# Patient Record
Sex: FEMALE | Race: BLACK OR AFRICAN AMERICAN | Employment: OTHER | URBAN - METROPOLITAN AREA
[De-identification: names, ages, dates, MRNs, and addresses within clinical notes are randomized per-mention and may not be internally consistent; named-entity substitution may affect disease eponyms.]

---

## 2020-11-12 ENCOUNTER — VIRTUAL VISIT (OUTPATIENT)
Dept: SURGERY | Age: 65
End: 2020-11-12
Payer: COMMERCIAL

## 2020-11-12 VITALS — BODY MASS INDEX: 34.55 KG/M2 | WEIGHT: 215 LBS | HEIGHT: 66 IN

## 2020-11-12 DIAGNOSIS — Z98.84 S/P BARIATRIC SURGERY: ICD-10-CM

## 2020-11-12 DIAGNOSIS — J45.909 UNCOMPLICATED ASTHMA, UNSPECIFIED ASTHMA SEVERITY, UNSPECIFIED WHETHER PERSISTENT: ICD-10-CM

## 2020-11-12 DIAGNOSIS — I10 ESSENTIAL HYPERTENSION: ICD-10-CM

## 2020-11-12 DIAGNOSIS — M17.9 OSTEOARTHRITIS OF KNEE, UNSPECIFIED LATERALITY, UNSPECIFIED OSTEOARTHRITIS TYPE: ICD-10-CM

## 2020-11-12 DIAGNOSIS — E66.01 SEVERE OBESITY (BMI 35.0-35.9 WITH COMORBIDITY) (HCC): Primary | ICD-10-CM

## 2020-11-12 DIAGNOSIS — K90.9 INTESTINAL MALABSORPTION, UNSPECIFIED TYPE: ICD-10-CM

## 2020-11-12 PROCEDURE — 99205 OFFICE O/P NEW HI 60 MIN: CPT | Performed by: NURSE PRACTITIONER

## 2020-11-12 RX ORDER — CONJUGATED ESTROGENS 0.62 MG/G
CREAM VAGINAL
COMMUNITY
Start: 2020-09-24

## 2020-11-12 RX ORDER — CLOBETASOL PROPIONATE 0.5 MG/G
OINTMENT TOPICAL
COMMUNITY
Start: 2020-11-06

## 2020-11-12 RX ORDER — CETIRIZINE HCL 10 MG
TABLET ORAL
COMMUNITY
Start: 2020-10-22

## 2020-11-12 RX ORDER — AA/PROT/LYSINE/METHIO/VIT C/B6 50-12.5 MG
TABLET ORAL
COMMUNITY
End: 2022-04-19 | Stop reason: ALTCHOICE

## 2020-11-12 RX ORDER — LANOLIN ALCOHOL/MO/W.PET/CERES
CREAM (GRAM) TOPICAL
COMMUNITY
Start: 2020-10-13

## 2020-11-12 RX ORDER — CEPHALEXIN 500 MG/1
CAPSULE ORAL
COMMUNITY
Start: 2020-10-29 | End: 2021-11-18 | Stop reason: ALTCHOICE

## 2020-11-12 RX ORDER — GABAPENTIN 300 MG/1
CAPSULE ORAL
COMMUNITY
Start: 2020-10-16

## 2020-11-12 NOTE — PROGRESS NOTES
Revision Surgery Consultation    Subjective: The patient is a 60 y.o. obese female with a Body mass index is 35.23 kg/m². .  The patient had a laparoscopic gastric bypass procedure done approximatly 13 years ago in [facility] by Dr. Dago Gordon. her starting weight prior to surgery was 260.  she ultimately lost approximately 109 lbs with a subsequent weight regain of 106 lbs. her peak EBWL at 2 years out from surgery was 87% and now her current EBWL is 36%. her last bariatric follow-up was 4 years ago with Dr. Dago Gordon. Ramos Chapmanbaum notes that she had no issues in the immediate post-op phase and had no hospital readmissions in the remote post-op phase. she currently is having the following issues related to her health:increasing HgA1c back up to 6% and had to restart BP medications. she is here today to discuss a possible revision of her gastric bypass because of weight regain. All of their prior evaluations available by both their PCP's and specialists physicians have been reviewed today either in the Care Everywhere portal or scanned under the media tab. I have spent a large portion of my initial consultation today reviewing the patients current dietary habits which have contributed to their health issues, weight regain and  their current obesity. They understand that generally speaking,  weight regain is  a function of resuming less that ideal dietary habits instead of being a procedural issue. They understand that older procedures are more likely to be associated with a less that perfect procedural result, such as a prior vertical banded gastroplasty or non divided gastric bypass. These procedures are more likely to result in staple line failures with resultant weight regain. This has been explained to the patient via diagrams of these older procedures and given to the patient.     I have suggested to them personally a dietary regimen that they can initiate now to help with their status as it pertains to their weight. They understand that the most important aspect of their journey through their weight loss endeavor will be their adherence to a new lifestyle of healthy eating behavior. They also understand that an adherence to an exercise program will not only help with weight loss but is ultimately important in weight maintenance. Patient Active Problem List    Diagnosis Date Noted    Obesity, Class II, BMI 35-39.9, with comorbidity     Obese     S/P bariatric surgery 08/12/2015    Intestinal malabsorption 08/12/2015    HTN (hypertension) 02/18/2011    Asthma 02/18/2011    DJD (degenerative joint disease) 02/18/2011    Hypercholesteremia 02/18/2011      Past Surgical History:   Procedure Laterality Date    HX BLADDER SUSPENSION      HX GYN      removal remaining ovary after hysterectomy    HX HYSTERECTOMY      HX ORTHOPAEDIC      achilles tendon repair    LAP GASTRIC BYPASS/LANA-EN-Y  1/2/07    Liver bx, extensive lysis of adhesions      Social History     Tobacco Use    Smoking status: Never Smoker    Smokeless tobacco: Never Used   Substance Use Topics    Alcohol use: No      Family History   Problem Relation Age of Onset    Hypertension Mother     Arthritis-rheumatoid Mother     Diabetes Father     Lung Disease Father     Hypertension Brother       Current Outpatient Medications   Medication Sig Dispense Refill    gabapentin (NEURONTIN) 300 mg capsule       cephALEXin (KEFLEX) 500 mg capsule       magnesium oxide (MAG-OX) 400 mg tablet       coenzyme q10 (Co Q-10) 10 mg cap Take  by mouth.  Premarin 0.625 mg/gram vaginal cream       clobetasoL (TEMOVATE) 0.05 % ointment       cetirizine (ZYRTEC) 10 mg tablet       amLODIPine (NORVASC) 10 mg tablet Take  by mouth daily.  cyanocobalamin (VITAMIN B-12) 1,000 mcg/mL injection 1 mL by SubCUTAneous route every fourteen (14) days.  2 Vial 11    Insulin Syringe-Needle U-100 (ADVOCATE SYRINGES) 1 mL 30 gauge x 5/16 syrg 1 mL by Does Not Apply route every thirty (30) days. 12 Syringe 2    multivitamin (ONE A DAY) tablet Take 1 Tab by mouth daily.  lisinopril (PRINIVIL, ZESTRIL) 20 mg tablet Take  by mouth daily.  cholecalciferol (VITAMIN D3) 1,000 unit tablet Take  by mouth daily.  montelukast (SINGULAIR) 10 mg tablet Take 10 mg by mouth daily.  fexofenadine (ALLEGRA) 180 mg tablet Take  by mouth.  omega-3 fatty acids-vitamin e (FISH OIL) 1,000 mg cap Take 1 Cap by mouth.  therapeutic multivitamin (THERAGRAN) tablet Take 1 Tab by mouth daily.  b complex vitamins (B COMPLEX 1) tablet Take 1 Tab by mouth daily.  CALCIUM CITRATE/VITAMIN D3 (CALCIUM CITRATE + D PO) Take  by mouth.  Biotin 2,500 mcg cap Take  by mouth.        Allergies   Allergen Reactions    Morphine Other (comments)     headache    Sulfa (Sulfonamide Antibiotics) Hives          Review of Systems:            General - No history or complaints of unexpected fever, chills, or weight loss  Head/Neck - No history or complaints of headache, diplopia, dysphagia, hearing loss  Cardiac - No history or complaints of chest pain, palpitations, murmur, or shortness of breath  Pulmonary - No history or complaints of shortness of breath, productive cough, hemoptysis  Gastrointestinal - No history or complaints of reflux,  abdominal pain, obstipation/constipation, blood per rectum  Genitourinary - No history or complaints of hematuria/dysuria, stress urinary incontinence symptoms, or renal lithiasis  Musculoskeletal - No history or complaints of joint pain or muscular weakness  Hematologic - No history or complaints of bleeding disorders, blood transfusions, sickle cell anemia  Neurologic - No history or complaints of  migraine headaches, seizure activity, syncopal episodes, TIA or stroke  Integumentary - No history or complaints of rashes, abnormal nevi, skin cancer  Gynecological - No history of heavy menses/abnormal menses           Objective:     Visit Vitals   5' 5.5\" (1.664 m)   Wt 97.5 kg (215 lb)   BMI 35.23 kg/m²       Physical Examination:   General appearance - well appearing and in no distress  Mental status - alert, oriented to person, place, and time  Pulmonary - normal respiratory effort  Abdomen - no obvious distention  Neurological - normal speech, no focal findings or movement disorder noted  Extremities - normal movement  Musculoskeletal - moving extremities without difficulty  Skin - no rashes, no suspicious skin lesions noted      Labs / Old Records: Old operative reports reviewed if available and are scanned under the media tab or reviewed under Care Everywhere        Assessment:     Morbid obesity status post gastric bypass procedure approximately 13 years ago with complaint of weight regain. Plan: 1. Weight regain-Today in our office I had a lengthy discussion with Julieta Littlejohn regarding the nature of their prior procedure. We discussed the anatomical changes to their anatomy and how this relates to  contributing weight regain. Our office will continue to attempt to obtain any medical records, if not obtained or available already ,related to their procedure. It was also discussed today that before any decisions can be made regarding a possible revision of their initial  procedure that an upper GI swallow study must be obtained to evaluate their post surgical anatomy. They understand that in patients with a prior open gastric bypass, those patients are not revision candidates due to adhesive disease usually, and the fact that post surgical anatomy  usually can not be improved upon. They understand if their gastric bypass was performed laparoscopically, then this situation might be more amendable to gastric band placement over their prior gastric bypass.  They understand, as I have explained today, that the adhesive disease associated with prior  procedures is at times a rate limiting factor. This precludes our ability to perform a revision procedure safely. The factors that contribute to this are increased risk such as age, health issues and increased risk from a procedural standpoint and have been discussed today. We will proceed with the UGI swallow study as described above. The patient understands all of the above and wishes to proceed with the study. 2.Nutrition-  I have discussed in detail the pitfalls in diet that have contributed to their weight regain and the importance of adhering to a lifelong regimen of dietary goals and proper eating habits. I have discussed the proper lifelong bariatric diet  in detail spending in excess of 20 minutes discussing this. We will schedule them for a dietary consultation with our nutritionist and urge them to continue on a regular follow-up schedule with her. 3.Maintenance vitamins- Today we have discussed the importance of vitamins as it pertains to their procedure and we will obtain appropriate lab to check all levels. They have been provided a handout regarding this today. Total time spent with the patient reviewing their complex history of bariatric surgery,diet, and plan is in excess of 60 minutes. Secondary Diagnoses:     Hypertension - The patient has a clear understanding of how weight loss improves hypertension as a whole, but also they understand that there is a significant genetic component to this disease process. We will monitor the patients blood pressure while in the hospital and the plan would be to continue those medications postoperatively.  If a diuretic is being used we will stop them on discharge to prevent dehydration particularly with the sleeve gastrectomy and the gastric bypass procedures.  They will be instructed to monitor their blood pressure postoperatively while at home and notify their primary care physician in the event of any significantly high or uncharacteristic readings.     Weight Related Arthritis -The patient understands the benefits that weight loss surgery can have on their arthritis but also understands that weight loss is not a guaranteed cure and relief of symptoms is often dependent on the severity of the underlying disease. The patient also understands that traditional pharmaceutical treatments for this diagnosis are usually unavailable to post-operative weight loss patients due to the effects on the gastrointestinal tract. Any changes to the patients medication treatment will ultimately be made the patients PCP with input by our office. Restrictive Airway Disease - We will continue all of their pulmonary medications in the form of oral pills and inhalers in both the perioperative and postoperative period. They understand that their symptoms should improve with weight loss. Any further testing related to this will be turned over to their family physician or pulmonologist. The patient understands that if they require oral or IV steroids in the future that they will notify us. This is particularly important for gastric bypass patients at all times and both sleeve  gastrectomy and gastric bypass patients in the 1 month pre op and 1 month post operative period. They understand that inhaled steroids are exempt from this. This visit with Ms Tylor Perez was performed under virtual telemedicine guidelines during the coronavirus (UINGK-83) public health emergency on 11/12/2020 in an interactive fashion using [employer]. me. They understand that this telemedicine encounter is a billable service, with coverage determined by their insurance carrier. They are aware that they may receive a bill and have provided verbal consent for this virtual visit. This visit was performed with the patient in their home environment and provider was present at [facility]. I have spent over 30 minutes on this visit  both prior to the visit reviewing the patients chart and with the patient face to face.  I have reviewed their medical history, performed a telemedicine physical examination, and discussed the plan of action to date. They understand that they will be asked   to come to the office when our office is allowed normal patient interaction, as dictated by public health officials, for a face-to-face visit to rediscuss all of the things we have talked about today.       Signed By: Josee Gómez NP     November 12, 2020

## 2020-11-12 NOTE — PATIENT INSTRUCTIONS
Patient Instructions 1. Remember hydration goals - minimum of 64 ounces of liquids per day (dehydration is the number one reason for hospital readmission). 2. Continue to monitor carbohydrate and protein intake you need a minimum of  Grams of protein daily- remember to keep your total carbohydrates to 50 grams or less per day for best results. 3. Continue to work towards exercise goals - 60-90 minutes, 5 times a week minimum of deliberate, aerobic exercise is the ultimate goal with strength training 2 times each week. Refer to [employer] for  information. 4. Remember to take vitamins as directed. 5. Attend support group the 2nd Thursday of each month. 6.  Constipation: Milk of Magnesia is for immediate relief only. Miralax is to be used every day if constipation is a chronic problem. 7.  Diarrhea: patients will occasionally develop lactose intolerance after surgery. Check to see if your protein shake has whey in it. If it does try a protein powder or drink that does not have whey and stop all yogurts, cheeses and milks to see if the diarrhea goes away. 8.  If you have had labs drawn. We will only call you if you have abnormal results. Otherwise you can access the lab results in \"mychart\". You will only need the access code the first time you sign on. 9.  Call us at [phone] or email us through [email or web address]" with questions,     concerns or worsening of condition, we have someone on call 24 hours a day. If you are unable to reach our office, you are to go to your Primary Care Physician or the Emergency Department. NOTE TO GASTRIC BYPASS PATIENTS:  (SAME APPLIES TO GASTRIC SLEEVE PATIENTS FOR FIRST TWO MONTHS) Remember that for the rest of your life, you are not able to take the following: 
- NSAIDs (ibuprofen, goody powder, BC powder, Motrin, Advil, Mobic, Voltaren, Excedrin, etc.) - Steroid pills or injections - Smoke (cigarettes or recreational drugs) - Alcohol Use of any of the above may cause ulcers in your stomach which may perforate causing a medical emergency and surgery. Speak to our medical staff if another medical provider requires you to take steroids or NSAIDs. Supplement Resource Guide Importance of Protein:  
Maintains lean body mass, produces antibodies to fight off infections, heals wounds, minimizes hair loss, helps to give you energy, helps with satiety, and keeping you full between meals. Importance of Calcium: 
Needed for healthy bones and teeth, normal blood clotting, and nervous system functioning, higher risk of osteoporosis and bone disease with non-compliance. Importance of Multivitamins: Many functions. Supply you with extra nutrients that you may be missing from food. May lead to iron deficiency anemia, weakness, fatigue, and many other symptoms with non-compliance. Importance of B Vitamins: 
Important for red blood cell formation, metabolism, energy, and helps to maintain a healthy nervous system. Protein Supplement Find one you like now. Use immediately after surgery. Look for: 
35-50g protein each day from your protein supplement once you reach the progression diet. 0-3 g fat per serving 0-3 g sugar per serving Protein drinks should be split in separate dosages. Recommend: Lifelong 1 year + Calcium Supplement:  
 
Start taking within a month after surgery. Look for: Calcium Citrate Plus D (1500 mg per day) Recommend: Citracal 
 
 . Avoid chocolate chewable calcium. Can use chewable bariatric or [employer] brand or similar chewable. The body cannot absorb more than 500-600 mg of calcium at a time. Take for Life Multi-vitamin Supplement:   
 
Start immediately after surgery: any complete chewable, such as: Parkins Complete chewables. Avoid [place] sours or gummies. They lack iron and other important nutrients and also have added sugar. Continue with chewable vitamin or change to adult complete multivitamin one month after surgery. Menstruating women can take a prenatal vitamin. Make sure has at least 18 mg iron and 036-288 mcg folic acid Vitamin B12, B Complex Vitamin, and Biotin Start taking within a month after surgery. Vitamin B12:  1000 mcg of Vitamin B12 three times weekly Must take sublingually (meaning you take it under your tongue) or in a liquid drop form for easy absorption. B Complex Vitamin: Take a pill or liquid drop form once daily. Biotin: This vitamin can help prevent hair loss. Recommend 5mg  
(5000 mcg) a day Biotin is Optional  
 
 
 
 
  
Learning About Physical Activity What is physical activity? Physical activity is any kind of activity that gets your body moving. The types of physical activity that can help you get fit and stay healthy include: · Aerobic or \"cardio\" activities that make your heart beat faster and make you breathe harder, such as brisk walking, riding a bike, or running. Aerobic activities strengthen your heart and lungs and build up your endurance. · Strength training activities that make your muscles work against, or \"resist,\" something, such as lifting weights or doing push-ups. These activities help tone and strengthen your muscles. · Stretches that allow you to move your joints and muscles through their full range of motion. Stretching helps you be more flexible and avoid injury. What are the benefits of physical activity? Being active is one of the best things you can do for your health. It helps you to: · Feel stronger and have more energy to do all the things you like to do. · Focus better at school or work. · Feel, think, and sleep better. · Reach and stay at a healthy weight. · Lose fat and build lean muscle. · Lower your risk for serious health problems. · Keep your bones, muscles, and joints strong. How can you make physical activity part of your life? Get at least 30 minutes of exercise on most days of the week. Walking is a good choice. You also may want to do other activities, such as running, swimming, cycling, or playing tennis or team sports. Pick activities that you likeones that make your heart beat faster, your muscles stronger, and your muscles and joints more flexible. If you find more than one thing you like doing, do them all. You don't have to do the same thing every day. Get your heart pumping every day. Any activity that makes your heart beat faster and keeps it at that rate for a while counts. Here are some great ways to get your heart beating faster: · Go for a brisk walk, run, or bike ride. · Go for a hike or swim. · Go in-line skating. · Play a game of touch football, basketball, or soccer. · Ride a bike. · Play tennis or racquetball. · Climb stairs. Even some household chores can be aerobicjust do them at a faster pace. Vacuuming, raking or mowing the lawn, sweeping the garage, and washing and waxing the car all can help get your heart rate up. Strengthen your muscles during the week. You don't have to lift heavy weights or grow big, bulky muscles to get stronger. Doing a few simple activities that make your muscles work against, or \"resist,\" something can help you get stronger. For example, you can: · Do push-ups or sit-ups, which use your own body weight as resistance. · Lift weights or dumbbells or use stretch bands at home or in a gym or community center. Stretch your muscles often. Stretching will help you as you become more active. It can help you stay flexible, loosen tight muscles, and avoid injury. It can also help improve your balance and posture and can be a great way to relax. Be sure to stretch the muscles you'll be using when you work out.  It's best to warm your muscles slightly before you stretch them. Walk or do some other light aerobic activity for a few minutes, and then start stretching. When you stretch your muscles: · Do it slowly. Stretching is not about going fast or making sudden movements. · Don't push or bounce during a stretch. · Hold each stretch for at least 15 to 30 seconds, if you can. You should feel a stretch in the muscle, but not pain. · Breathe out as you do the stretch. Then breathe in as you hold the stretch. Don't hold your breath. If you're worried about how more activity might affect your health, have a checkup before you start. Follow any special advice your doctor gives you for getting a smart start. Where can you learn more? Go to http://www.gray.com/ Enter M032 in the search box to learn more about \"Learning About Physical Activity. \" Current as of: January 16, 2020               Content Version: 12.6 © 2006-2020 [employer]. Care instructions adapted under license by [employer] (which disclaims liability or warranty for this information). If you have questions about a medical condition or this instruction, always ask your healthcare professional. [address] any warranty or liability for your use of this information.

## 2020-12-14 ENCOUNTER — OFFICE VISIT (OUTPATIENT)
Dept: SURGERY | Age: 65
End: 2020-12-14

## 2020-12-14 VITALS — HEIGHT: 66 IN | WEIGHT: 215 LBS | BODY MASS INDEX: 34.55 KG/M2

## 2020-12-14 DIAGNOSIS — Z98.84 S/P BARIATRIC SURGERY: Primary | ICD-10-CM

## 2020-12-14 NOTE — PROGRESS NOTES
Geovanni Paez participated in an educational session on the importance of starting to make healthy choices prior to weight loss surgery. General healthy foods were reviewed. Diet history was reviewed. Patient set a dietary, exercise, and behavioral goal in order to start making healthy changes now. Visit Vitals  Ht 5' 5.5\" (1.664 m)   Wt 97.5 kg (215 lb)   BMI 35.23 kg/m²           Today the majority of our visit was spent reviewing patient's food recall and identifying areas for improvement. Educated  on the importance of eating 3 meals/day at regularly scheduled times including breakfast within 1st 1-2 hours of waking. Suggested patient use a protein supplement as a meal replacement instead of skipping OR as a between meal high protein snack. Also educated on the importance of including a protein with every meal and snack and reviewed lean sources. Lastly introduced  the Plate Method for Meal Planning and reviewed portions recommended with tips for portion control. Discussed label reading and recommendations for carbohydrate counting. Reviewed the importance of adequate hydration and protein intake - emphasizing the role of protein supplements in the post operative diet. Discussed vitamin and mineral supplementation forever following surgery. Encouraged patient to review key diet principles of surgery and we will discuss individual questions or concerns. Patient was  receptive to education and we will continue to review and reinforce in our follow-up next month. Goals:   1. Work to increase to 3-4 small meals per day, with planned snacks as needed. Recommend following plate method for meal planning - focusing on lean protein, non-starchy vegetables, and measured amounts of starch. - Goal of  g protein and  g carbohydrate per day. - Recommend continuing protein supplement as meal replacement at least 1x/day  2. Increase non caloric fluid to 64 oz per day.   Eliminate caffeine, added sugar, carbonation, and straws.               -Continue to work to decrease sugar sweetened beverages - goal of calorie free beverages only              -Must eliminate caffeine prior to surgery and avoid for ~6-8 weeks  3. Start activity regimen, work to increase ADL              -Try to start walking for at least 30-60  minutes 3-5 days per week  4.  Start Complete MVI, no gummies    12/14/20    Kaia William

## 2021-01-06 ENCOUNTER — APPOINTMENT (OUTPATIENT)
Dept: GENERAL RADIOLOGY | Age: 66
End: 2021-01-06
Attending: SPECIALIST
Payer: COMMERCIAL

## 2021-01-06 ENCOUNTER — HOSPITAL ENCOUNTER (OUTPATIENT)
Age: 66
Setting detail: OUTPATIENT SURGERY
Discharge: HOME | End: 2021-01-06
Attending: SPECIALIST | Admitting: SPECIALIST
Payer: COMMERCIAL

## 2021-01-06 VITALS
DIASTOLIC BLOOD PRESSURE: 77 MMHG | WEIGHT: 222.7 LBS | BODY MASS INDEX: 35.79 KG/M2 | HEART RATE: 77 BPM | TEMPERATURE: 97 F | OXYGEN SATURATION: 100 % | SYSTOLIC BLOOD PRESSURE: 170 MMHG | HEIGHT: 66 IN | RESPIRATION RATE: 18 BRPM

## 2021-01-06 DIAGNOSIS — E66.01 MORBID OBESITY (HCC): ICD-10-CM

## 2021-01-06 DIAGNOSIS — K44.9 HIATAL HERNIA: ICD-10-CM

## 2021-01-06 PROCEDURE — 76040000019: Performed by: SPECIALIST

## 2021-01-06 PROCEDURE — 77030040361 HC SLV COMPR DVT MDII -B: Performed by: SPECIALIST

## 2021-01-06 PROCEDURE — 74240 X-RAY XM UPR GI TRC 1CNTRST: CPT

## 2021-01-06 PROCEDURE — 74240 X-RAY XM UPR GI TRC 1CNTRST: CPT | Performed by: SPECIALIST

## 2021-01-06 PROCEDURE — 74011000255 HC RX REV CODE- 255: Performed by: SPECIALIST

## 2021-01-06 PROCEDURE — 2709999900 HC NON-CHARGEABLE SUPPLY: Performed by: SPECIALIST

## 2021-01-06 NOTE — PROCEDURES
13 years post our gastric bypass seeking revision with weight regain and BMI of 35. UGI shows pouch inside of a notable hiatal hernia which given all factors negates her from having any kind of \"revision or salvage\" type procedure. Needs diet and exercise.   See dictation

## 2021-01-07 NOTE — OP NOTES
[facility]  OPERATIVE REPORT    Name:  Kojo Goyal  MR#:   [id number]  :  1955  ACCOUNT #:  [de-identified]  DATE OF SERVICE:  2021    PREOPERATIVE DIAGNOSIS:  The patient is 13 years post gastric bypass procedure with weight regain. POSTOPERATIVE DIAGNOSIS:  The patient is 13 years post gastric bypass procedure with weight regain. PROCEDURE PERFORMED:  Upper GI study with barium. SURGEON:  Mary Carmen Bauman MD    ASSISTANT:  None. ANESTHESIA:  None. COMPLICATIONS:  None. SPECIMENS REMOVED:  None. IMPLANTS:  None. ESTIMATED BLOOD LOSS:  None. INDICATIONS:  The patient is a patient of mine who is 13 years out from gastric bypass procedure performed laparoscopically. She has regained some weight over time about 25 pounds in total, and her BMI is currently 35. She has inquired as to whether or not she is a candidate for any type of revision. She is here today for upper GI study. PROCEDURE:  The patient was brought to fluoro unit where she was given thin barium. On swallowing the barium, she was noted to have normal peristalsis of her esophagus. She had filling in the distal esophagus with tapering into what appeared to be the gastric pouch, which was vertically oriented. On multiple views of this region, it was apparent that the gastric pouch was pulled into her intrathoracic and the gastrojejunal anastomosis was at the level of the diaphragm. Contrast readily flowed through the anastomosis in the Rylie limb, which was nonobstructed. At this juncture, I have explained to the patient that her pouch is too small for any type of revision. She is also not a candidate for any type of band over bypass procedure since her pouch is basically intrathoracic and that it would be impossible to make that type of procedure work. She is going to enroll in our medical weight loss program in hopes of losing the appropriate amount of weight.       Jean Jacob, MD [id number]  D:  01/06/2021 18:45  T:  01/06/2021 22:44  JOB #:  [id number]

## 2021-01-18 ENCOUNTER — CLINICAL SUPPORT (OUTPATIENT)
Dept: SURGERY | Age: 66
End: 2021-01-18

## 2021-01-18 VITALS — BODY MASS INDEX: 35.68 KG/M2 | HEIGHT: 66 IN | WEIGHT: 222 LBS

## 2021-01-18 DIAGNOSIS — E66.01 SEVERE OBESITY (BMI 35.0-35.9 WITH COMORBIDITY) (HCC): Primary | ICD-10-CM

## 2021-01-18 NOTE — PROGRESS NOTES
Medical Weight Loss Multi-Disciplinary Program    Name: Abdoul Samson   : 1955    Session# 2- revision   Date: 2021    Visit Vitals  Ht 5' 5.5\" (1.664 m)   Wt 100.7 kg (222 lb)   BMI 36.38 kg/m²     Dietary Instructions    Reviewed intake  Understanding low carbohydrates, low sugar, higher protein meals  Instruction given for personal dietary changes  Discussed perceived compliance  Comments: Diet hx reviewed and personal dietary changes discussed. Reviewed recommendation to follow [id number] calorie diet, working to reduce total carbohydrate intake to 50-65 g or less per day and increasing protein intake to  g per day, compared current intake to recommendations. After reviewing regular intake portions  RD reviewed recommend diet progression following surgery. Reviewed addition of simple sugars, excessive fat with protein portions, and displacement of nutrient rich foods with high calorie low nutrient convenience options. We reviewed the bariatric plate method for meal planning with portion guide. Assisted with goal setting, provided recommend snack list, easy to prepare high protein, low carbohydrate food choices, provided handout reviewing complex carbohydrate vs simple sugars and reviewed appropriate portion sizes. Encouraged compliance with protein supplements, vitamins and mineral supplementations, fluid, and  continuing physical activity. Nutritional Hx: What is the number of meals you eat per day? 2-3  Comment:     Do you eat between meals / snack? yes  Typical snack: sweets, candy, snack cakes     Have you sampled protein supplements? no    How often do you eat fast food, take-out restaurant food? 4 times a week    How fast do you eat your meals? rapid    How many sodas/sugared beverages do you drink per day? None avoids     How many caffeinated drinks do you have per day?  Diet soda, coffee    How much water do you drink per day? 4 (8oz glasses)    How often do you consume alcohol? occasionally;     Are you taking complete multivitamin: yes    Physical Activity/Exercise    Discussed Perceived Compliance  Motivation    Patient has not started physical activity regimen, reinforced the importance of regular physical activity for total health and weight management.  Suggested starting walking for at least 5 days per week for 30-45  minutes, patient was  receptive to recommendation.       Behavior Modification    Identify obstacles to trigger change  Achieving/Rewarding goals met  Positive attitude  Comments: Reinforced importance continuing to modify lifestyle patterns and behaviors to promote weight loss and long term weight maintenance       Goals:   1. Work to increase to 3-4 small meals per day, with planned snacks as needed.  Recommend following plate method for meal planning - focusing on lean protein, non-starchy vegetables, and measured amounts of starch.   - Goal of  g protein and  g carbohydrate per day.               - Recommend continuing protein supplement as meal replacement at least 1x/day OR as high protein snack option  2. Increase non caloric fluid to 64 oz per day.  Eliminate caffeine, added sugar, carbonation, and straws.               -Continue to work to decrease sugar sweetened beverages - goal of calorie free beverages only              -Must eliminate caffeine prior to surgery and avoid for ~6-8 weeks   -Practice 30:30 rule,  food and flood   3. Start activity regimen, work to increase ADL  4. Continue vitamin and mineral regimen   2 Complete Multivitamin per day  - Children's Complete Chewable OR Women’s One-A-Days  OR Prenatal MVI  · Taken for LIFE   B12 ? 1000mcg for LIFE  ·  Two options for taking B12:  · sublingually (dissolves under your tongue) or monthly shot (prescription written by your PCP)  · Taken for LIFE    B Complex (B50 supercomlex)  · Taken for at least the first year after surgery   Vitamins D3  · 3000 international units  per day    Calcium Citrate ? 1500 mg/d  broken down into 500 mg 3 times a day   · 3 Calcium Citrate Chewy Bites 3 times a day (sold in our office & online), OR  · Citrical 500-600 mg)3 times a day   · MUST BE TAKEN AT LEAST 2 HOURS AFTER YOUR MVI  · Taken for LIFE   Biotin ? 5000 mcg/day or labeled 5mg/day ? OPTIONAL          Candidate for surgery (per RD):  PENDING-not revision candidate at this time    Dietitian: Asuncion Chavez RD

## 2021-01-28 ENCOUNTER — VIRTUAL VISIT (OUTPATIENT)
Dept: SURGERY | Age: 66
End: 2021-01-28
Payer: COMMERCIAL

## 2021-01-28 VITALS — HEIGHT: 66 IN | WEIGHT: 222 LBS | BODY MASS INDEX: 35.68 KG/M2

## 2021-01-28 DIAGNOSIS — E66.01 SEVERE OBESITY (BMI 35.0-35.9 WITH COMORBIDITY) (HCC): Primary | ICD-10-CM

## 2021-01-28 DIAGNOSIS — I10 ESSENTIAL HYPERTENSION: ICD-10-CM

## 2021-01-28 DIAGNOSIS — E88.81 METABOLIC SYNDROME: ICD-10-CM

## 2021-01-28 DIAGNOSIS — Z98.84 S/P BARIATRIC SURGERY: ICD-10-CM

## 2021-01-28 DIAGNOSIS — K90.9 INTESTINAL MALABSORPTION, UNSPECIFIED TYPE: ICD-10-CM

## 2021-01-28 PROCEDURE — 99214 OFFICE O/P EST MOD 30 MIN: CPT | Performed by: NURSE PRACTITIONER

## 2021-01-28 RX ORDER — MAGNESIUM 200 MG
1000 TABLET ORAL DAILY
COMMUNITY
End: 2022-04-19 | Stop reason: ALTCHOICE

## 2021-01-28 RX ORDER — BLOOD SUGAR DIAGNOSTIC
STRIP MISCELLANEOUS
Qty: 100 PEN NEEDLE | Refills: 0 | Status: SHIPPED | OUTPATIENT
Start: 2021-01-28 | End: 2021-01-28 | Stop reason: SDUPTHER

## 2021-01-28 RX ORDER — BLOOD SUGAR DIAGNOSTIC
STRIP MISCELLANEOUS
Qty: 100 PEN NEEDLE | Refills: 0 | Status: SHIPPED | OUTPATIENT
Start: 2021-01-28 | End: 2021-04-19 | Stop reason: SDUPTHER

## 2021-01-28 RX ORDER — NYSTATIN 100000 U/G
CREAM TOPICAL
COMMUNITY
Start: 2020-12-29

## 2021-01-28 NOTE — PATIENT INSTRUCTIONS
Warnings and Precautions ? Risk of Thyroid C-cell Tumors: If serum calcitonin is measured and found to be elevated, the patient should be further evaluated. Patients with thyroid nodules noted on physical examination or neck imaging should also be further evaluated ? Acute Pancreatitis: Acute pancreatitis, including fatal and non-fatal hemorrhagic or necrotizing pancreatitis, has been observed in patients treated with liraglutide postmarketing. Observe patients carefully for signs and symptoms of pancreatitis (persistent severe abdominal pain, sometimes radiating to the back with or without vomiting). If pancreatitis is suspected, discontinue Saxenda® promptly and if pancreatitis is confirmed, do not restart ? Acute Gallbladder Disease: Substantial or rapid weight loss can increase the risk of cholelithiasis; however, the incidence of acute gallbladder disease was greater in patients treated with Saxenda® than with placebo even after accounting for the degree of weight loss. If cholelithiasis is suspected, gallbladder studies and appropriate clinical follow-up are indicated ? Risk of Hypoglycemia with Concomitant Use of Anti-Diabetic Therapy: When Юлия Arrant is used with an insulin secretagogue (eg, a sulfonylurea) serious hypoglycemia can occur. Consider lowering the dose of the insulin secretagogue to reduce the risk of hypoglycemia. Monitor blood glucose parameters prior to starting Юлия Arrant and during treatment and adjust anti-diabetic drugs as needed  
? Heart Rate Increase: Mean increases in resting heart rate of 2 to 3 beats per minute (bpm) were observed in patients treated with Saxenda®. Monitor heart rate at regular intervals and inform patients to report palpitations or feelings of a racing heartbeat while at rest during treatment with Юлия Arrant. Discontinue Saxenda® in patients who experience a sustained increase in resting heart rate ? Renal Impairment: Acute renal failure and worsening of chronic renal failure, which may sometimes require hemodialysis, have been reported, usually in association with nausea, vomiting, diarrhea, or dehydration. Use caution when initiating or escalating doses of Saxenda® in patients with renal impairment ? Hypersensitivity Reactions: Serious hypersensitivity reactions (eg, anaphylaxis and angioedema) have been reported in patients treated with liraglutide. If a hypersensitivity reaction occurs, patients should stop taking Saxenda® and promptly seek medical advice ? Suicidal Behavior and Ideation: In clinical trials, 9 (0.3%) of 3,384 patients treated with Saxenda® and 2 (0.1%) of the 1,941 treated with placebo reported suicidal ideation; one of the patients treated with Saxenda® attempted suicide. Monitor patients on Saxenda® for the emergence or worsening of depression, suicidal thoughts or behavior, and/or any unusual changes in mood or behavior. Discontinue treatment if patients experience suicidal thoughts or behaviors. Avoid Saxenda® in patients with a history of suicidal attempts or active suicidal ideation Adverse Events ? The most common adverse reactions, reported in ?5% are: nausea, hypoglycemia, diarrhea, constipation, vomiting, headache, decreased appetite, dyspepsia, fatigue, dizziness, abdominal pain, and increased lipase Drug Interactions ? Saxenda® causes a delay of gastric emptying, and has the potential to impact the absorption of concomitantly administered oral medications. Monitor for potential consequences of delayed absorption of oral medications concomitantly administered with Saxenda®

## 2021-01-28 NOTE — PROGRESS NOTES
Medical Weight Loss Consultation    Subjective: The patient is a 60 y.o. obese female with a Body mass index is 36.38 kg/m². .  The patient is s/p laparoscopic gastric bypass procedure done approximately 13 years ago in [address] [place] [address] by Dr. Mynor Reynoso. her starting weight prior to surgery was 260.  she ultimately lost approximately 109 lbs with a subsequent weight regain of 106 lbs. her peak EBWL at 2 years out from surgery was 87% and now her current EBWL is 36%. She was exploring the possibility of surgical revision with Dr Mynor Reynoso, but found on recent UGI NOT to be a surgical candidate and sent for medical weight loss consult. Bariatric comorbidities present are   Patient Active Problem List   Diagnosis Code    HTN (hypertension) I10    Asthma J45.909    S/P bariatric surgery Z98.84    Intestinal malabsorption K90.9    Obese E66.9    Severe obesity (BMI 35.0-35.9 with comorbidity) (HCC) E66.01, Z68.35    DJD (degenerative joint disease) M19.90    Hypercholesteremia E78.00       Weight History  Current weight 222 lbs and BMI is Body mass index is 36.38 kg/m². Our goal weight is 170lb. (this represents a BMI of 28). she realizes that a multiyear dedication to diet and exercise are required to achieve this goal.    Weight loss History  How many weight loss attempts have you had?  several  Which program were you most successful doing?   [facility]    Significant Medical History  Ever diagnosed with sleep apnea or put on CPAP no  Ever had bariatric surgery: yes  Pregnant or planning on becoming pregnant w/in 6 months: no    Significant Psychosocial History   Any history of drug abuse or dependence: no  Current Major Lifestyle Changes: no  Any potential unsupportive people: no  History of binge eating disorder or anorexia : no   If yes, are you currently being treated no    Social History  Social History     Tobacco Use    Smoking status: Never Smoker    Smokeless tobacco: Never Used   Substance Use Topics    Alcohol use: No         Exercise  Have you ever been told by a physician not to exercise?  no          Patient Active Problem List    Diagnosis Date Noted    Severe obesity (BMI 35.0-35.9 with comorbidity) ([facility] [address]     Obese     S/P bariatric surgery 08/12/2015    Intestinal malabsorption 08/12/2015    HTN (hypertension) 02/18/2011    Asthma 02/18/2011    DJD (degenerative joint disease) 02/18/2011    Hypercholesteremia 02/18/2011      Past Surgical History:   Procedure Laterality Date    HX BLADDER SUSPENSION      HX GYN      removal remaining ovary after hysterectomy    HX HYSTERECTOMY      HX ORTHOPAEDIC      achilles tendon repair    LAP GASTRIC BYPASS/LANA-EN-Y  1/2/07    Liver bx, extensive lysis of adhesions      Social History     Tobacco Use    Smoking status: Never Smoker    Smokeless tobacco: Never Used   Substance Use Topics    Alcohol use: No      Family History   Problem Relation Age of Onset    Hypertension Mother     Arthritis-rheumatoid Mother     Diabetes Father     Lung Disease Father     Hypertension Brother       Current Outpatient Medications   Medication Sig Dispense Refill    cyanocobalamin (VITAMIN B-12) 1,000 mcg sublingual tablet Take 1,000 mcg by mouth daily.  nystatin (MYCOSTATIN) topical cream       gabapentin (NEURONTIN) 300 mg capsule       cephALEXin (KEFLEX) 500 mg capsule       magnesium oxide (MAG-OX) 400 mg tablet       coenzyme q10 (Co Q-10) 10 mg cap Take  by mouth.  Premarin 0.625 mg/gram vaginal cream       clobetasoL (TEMOVATE) 0.05 % ointment       cetirizine (ZYRTEC) 10 mg tablet       amLODIPine (NORVASC) 10 mg tablet Take  by mouth daily.  multivitamin (ONE A DAY) tablet Take 1 Tab by mouth daily.  lisinopril (PRINIVIL, ZESTRIL) 20 mg tablet Take  by mouth daily.  cholecalciferol (VITAMIN D3) 1,000 unit tablet Take  by mouth daily.  montelukast (SINGULAIR) 10 mg tablet Take 10 mg by mouth daily.       fexofenadine (ALLEGRA) 180 mg tablet Take  by mouth.  omega-3 fatty acids-vitamin e (FISH OIL) 1,000 mg cap Take 1 Cap by mouth.  therapeutic multivitamin (THERAGRAN) tablet Take 1 Tab by mouth daily.  b complex vitamins (B COMPLEX 1) tablet Take 1 Tab by mouth daily.  CALCIUM CITRATE/VITAMIN D3 (CALCIUM CITRATE + D PO) Take  by mouth.          Allergies   Allergen Reactions    Morphine Other (comments)     headache    Sulfa (Sulfonamide Antibiotics) Hives          Review of Systems:        General - No history or complaints of unexpected fever, chills, or weight loss  Head/Neck - No history or complaints of headache, diplopia, dysphagia, hearing loss  Cardiac - No history or complaints of chest pain, palpitations, murmur, or shortness of breath  Pulmonary - No history or complaints of shortness of breath, productive cough, hemoptysis  Gastrointestinal - no reflux, no  abdominal pain, obstipation/constipation or blood per rectum  Genitourinary - No history or complaints of hematuria/dysuria, stress urinary incontinence symptoms, or renal lithiasis  Musculoskeletal - has joint pain,  no muscular weakness  Hematologic - No history or complaints of bleeding disorders,  No blood transfusions  Neurologic - No history or complaints of  migraine headaches, seizure activity, syncopal episodes, TIA or stroke  Integumentary - No history or complaints of rashes, abnormal nevi, skin cancer  Gynecological - No abnormal bleeding s/p hysterectomy    Objective:     Visit Vitals   5' 5.5\" (1.664 m)   Wt 100.7 kg (222 lb)   BMI 36.38 kg/m²       Physical Examination:   General appearance - well appearing and in no distress  Mental status - alert, oriented to person, place, and time  Pulmonary - normal respiratory effort  Abdomen - no obvious distention  Neurological - normal speech, no focal findings or movement disorder noted  Extremities - normal movement  Musculoskeletal - moving extremities without difficulty  Skin - no rashes, no suspicious skin lesions noted      Labs:     No results found for this or any previous visit (from the past 1008 hour(s)). Assessment:     Obesity with associated comorbidity    Plan:     The patient is currently electing to forego surgical weight loss and proceed with medically managed weight loss. she understands that diet planning and exercise are at the center of this program.  Today we have discussed the concept of diet and calorie intake and its role in obesity. The patient will be begin a high protein / low carbohydrate diet (to include meal replacement with protein shakes) that will limit their overall calorie intake. Plans to meet with office dietary staff regularly for more indepth dietary education have been outlined. Today we have also discussed her existing medical / metabolic conditions and how these may affect their ability to lose weight and how their primary care physician may be asked to add input regarding their weight loss attempts. The patient's initial goal as they begin medically managed weight loss is to focus on six goals to include; high protein breakfast, attempt to consume at least 80 grams of protein per day, limit carbohydrates to less than 40 grams per day, exercise to the patient's best ability, focus on proper hydration and get an adequate amount of sleep. Thyroid function studies will be drawn and or reviewed. Based on history and exam, Loren Taylor is a good candidate for Medical Weight Loss Program.  Will set up appt with dietician. Prior to starting weight loss program, pt advised to: As you prepare to start the medical weight loss program:  1. Get your labs if ordered. 2.  Start the day with a high protein breakfast.  3.  To try to increase  Protein intake to over 75 grams daily. Encouraged to track using an teresa like [employer] or loseit  4. To start decreasing your carbs - aiming for less than 75 grams daily.   5.  To increase your activity as able.  6.  To stay hydrated.  7.  To get good sleep.  8.  To consider reading Mindful Eating.  9. Will initiate Saxenda  Chronic weight management (Saxenda): SubQ: Initial: 0.6 mg once daily for one week; increase by 0.6 mg daily at weekly intervals to a target dose of 3 mg once daily. If the patient cannot tolerate an increased dose during dose escalation, consider delaying dose escalation for one additional week. If the 3 mg daily dose is not tolerated, discontinue use as efficacy has not been established at lower doses.  Note: Evaluate change in body weight 16 weeks after initiation of therapy; discontinue if at least 4% of baseline body weight loss has not been achieved.  SubQ: Inject subcutaneously in the upper arm, thigh, or abdomen. Do not inject intravenously or intramuscularly. Administer without regard to meals or time of day. Change needle with each administration. Use only if clear, colorless, and free of particulate matter. Do not share pens between patients even if needle is changed. If using concomitantly with insulin, administer as separate injections (do not mix); may inject in the same body region as insulin, but not adjacent to one another.    Adverse reactions, contridations, warnings, and precautions according to the rx info reviewed with pt. Pt aware of risk of hypoglycemia and will monitor and record her blood sugars. She will notify me of any SE/SS.    Secondary Diagnoses:       Hypertension - The patient has a clear understanding of how weight loss improves hypertension as a whole, but also they understand that there is a significant genetic component to this disease process. We will monitor the patient’s blood pressure while in the hospital and the plan would be to continue those medications postoperatively.  If a diuretic is being used we will stop them on discharge to prevent dehydration particularly with the sleeve gastrectomy and the gastric bypass procedures.   They will be instructed to monitor their blood pressure postoperatively while at home and notify their primary care physician in the event of any significantly high or uncharacteristic readings.     Signed By: Wally Hewitt NP     January 28, 2021

## 2021-02-01 ENCOUNTER — VIRTUAL VISIT (OUTPATIENT)
Dept: SURGERY | Age: 66
End: 2021-02-01
Payer: COMMERCIAL

## 2021-02-01 VITALS — HEIGHT: 66 IN | WEIGHT: 222 LBS | BODY MASS INDEX: 35.68 KG/M2

## 2021-02-01 DIAGNOSIS — Z98.84 S/P BARIATRIC SURGERY: ICD-10-CM

## 2021-02-01 DIAGNOSIS — E88.81 METABOLIC SYNDROME: Primary | ICD-10-CM

## 2021-02-01 DIAGNOSIS — K90.9 INTESTINAL MALABSORPTION, UNSPECIFIED TYPE: ICD-10-CM

## 2021-02-01 PROCEDURE — 99212 OFFICE O/P EST SF 10 MIN: CPT | Performed by: NURSE PRACTITIONER

## 2021-02-01 NOTE — PROGRESS NOTES
Mychal Alonzo (: 1955) is a 60 y.o. female, established patient, here for evaluation of the following chief complaint(s):   Here to be taught demonstration of liraglutide medication as found not to be a candidate for surgical revision. ASSESSMENT/PLAN:  1. Metabolic syndrome    2. Intestinal malabsorption, unspecified type    3. S/P bariatric surgery        No follow-ups on file. Review of Systems:         General - No history or complaints of unexpected fever, chills, or weight loss  Head/Neck - No history or complaints of headache, diplopia, dysphagia, hearing loss  Cardiac - No history or complaints of chest pain, palpitations, murmur, or shortness of breath  Pulmonary - No history or complaints of shortness of breath, productive cough, hemoptysis  Gastrointestinal - no reflux, no  abdominal pain, obstipation/constipation or blood per rectum  Genitourinary - No history or complaints of hematuria/dysuria, stress urinary incontinence symptoms, or renal lithiasis  Musculoskeletal - has joint pain,  no muscular weakness  Hematologic - No history or complaints of bleeding disorders,  No blood transfusions  Neurologic - No history or complaints of  migraine headaches, seizure activity, syncopal episodes, TIA or stroke  Integumentary - No history or complaints of rashes, abnormal nevi, skin cancer  Gynecological - No abnormal bleeding s/p hysterectomy      No flowsheet data found.     Physical Exam    [INSTRUCTIONS:  \"[x]\" Indicates a positive item  \"[]\" Indicates a negative item  -- DELETE ALL ITEMS NOT EXAMINED]    Constitutional: [x] Appears well-developed and well-nourished [x] No apparent distress      [] Abnormal -     Mental status: [x] Alert and awake  [x] Oriented to person/place/time [x] Able to follow commands    [] Abnormal -     Eyes:   EOM    [x]  Normal    [] Abnormal -   Sclera  [x]  Normal    [] Abnormal -          Discharge [x]  None visible   [] Abnormal -     HENT: [x] Normocephalic, atraumatic  [] Abnormal -   [x] Mouth/Throat: Mucous membranes are moist    External Ears [x] Normal  [] Abnormal -    Neck: [x] No visualized mass [] Abnormal -     Pulmonary/Chest: [x] Respiratory effort normal   [x] No visualized signs of difficulty breathing or respiratory distress        [] Abnormal -      Musculoskeletal:   [x] Normal gait with no signs of ataxia         [x] Normal range of motion of neck        [] Abnormal -     Neurological:        [x] No Facial Asymmetry (Cranial nerve 7 motor function) (limited exam due to video visit)          [x] No gaze palsy        [] Abnormal -          Skin:        [x] No significant exanthematous lesions or discoloration noted on facial skin         [] Abnormal -            Psychiatric:       [x] Normal Affect [] Abnormal -        [x] No Hallucinations    Other pertinent observable physical exam findings:-        On this date 02/01/21 I have spent 15 minutes reviewing previous notes, test results and face to face (virtual) with the patient discussing the diagnosis and importance of compliance with the treatment plan as well as documenting on the day of the visit. Michele Ojeda is being evaluated by a Virtual Visit (video visit) encounter to address concerns as mentioned above. A caregiver was present when appropriate. Due to this being a TeleHealth encounter ([facility]), evaluation of the following organ systems was limited: Vitals/Constitutional/EENT/Resp/CV/GI//MS/Neuro/Skin/Heme-Lymph-Imm. Pursuant to the emergency declaration under the [facility], [address] [place] [address] authority and the [employer] and [employer] Act, this Virtual Visit was conducted with patient's (and/or legal guardian's) consent, to reduce the patient's risk of exposure to COVID-19 and provide necessary medical care.   The patient (and/or legal guardian) has also been advised to contact this office for worsening conditions or problems, and seek emergency medical treatment and/or call 911 if deemed necessary. Patient identification was verified at the start of the visit: YES    Services were provided through a video synchronous discussion virtually to substitute for in-person clinic visit. Patient was located at home and provider was located in office or at home. An electronic signature was used to authenticate this note.   -- Rica Diehl NP

## 2021-02-11 ENCOUNTER — CLINICAL SUPPORT (OUTPATIENT)
Dept: SURGERY | Age: 66
End: 2021-02-11

## 2021-02-11 VITALS — HEIGHT: 66 IN | BODY MASS INDEX: 35.68 KG/M2 | WEIGHT: 222 LBS

## 2021-02-11 DIAGNOSIS — E66.01 SEVERE OBESITY (BMI 35.0-35.9 WITH COMORBIDITY) (HCC): Primary | ICD-10-CM

## 2021-02-11 NOTE — PROGRESS NOTES
Medical Weight Loss Multi-Disciplinary Program    Name: Ana Rosa Stanley   : 1955    Session# 3- not revision candidate   Date: 2021    Visit Vitals  Ht 5' 5.5\" (1.664 m)   Wt 100.7 kg (222 lb)   BMI 36.38 kg/m²     Dietary Instructions    Reviewed intake  Understanding low carbohydrates, low sugar, higher protein meals  Instruction given for personal dietary changes  Discussed perceived compliance  Comments: Diet hx reviewed and personal dietary changes discussed. Reviewed recommendation to follow 4003-4700 calorie diet, working to reduce total carbohydrate intake to 50-65 g or less per day and increasing protein intake to  g per day, compared current intake to recommendations. After reviewing regular intake portions  RD reviewed recommend diet progression following surgery. Reviewed addition of simple sugars, excessive fat with protein portions, and displacement of nutrient rich foods with high calorie low nutrient convenience options. We reviewed the bariatric plate method for meal planning with portion guide. Assisted with goal setting, provided recommend snack list, easy to prepare high protein, low carbohydrate food choices, provided handout reviewing complex carbohydrate vs simple sugars and reviewed appropriate portion sizes. Encouraged compliance with protein supplements, vitamins and mineral supplementations, fluid, and  continuing physical activity. Typical intake is as follows:  Breakfast: 1 pack of instant grits  Lunch: Salad greens with deli meat (ham) with boiled egg, cucumber, tomatoes with FF Rodrigo dressing (2 Tbsp= 25 calories)  Snack: hard candy (doing in place of sweet cravings- darian hard candy)   Dinner: Baked fish with mixed vegetables   Snacks: still having gingersnaps and sweets in the evening (reinfoced carbohydrate counting)  Fluids: 64 oz water, diet soda     Nutritional Hx:   What is the number of meals you eat per day? 2-3  Comment:     Do you eat between meals / snack? yes  Typical snack: sweets, candy, snack cakes     Have you sampled protein supplements? no    How often do you eat fast food, take-out restaurant food? 4 times a week    How fast do you eat your meals? rapid    How many sodas/sugared beverages do you drink per day? None avoids     How many caffeinated drinks do you have per day? Diet soda, coffee    How much water do you drink per day? 4 (8oz glasses)    How often do you consume alcohol? occasionally; Are you taking complete multivitamin: yes    Physical Activity/Exercise    Discussed Perceived Compliance  Motivation    Patient has not started physical activity regimen, reinforced the importance of regular physical activity for total health and weight management. Suggested starting walking for at least 5 days per week for 30-45  minutes, patient was  receptive to recommendation. Behavior Modification    Identify obstacles to trigger change  Achieving/Rewarding goals met  Positive attitude  Comments: Reinforced importance continuing to modify lifestyle patterns and behaviors to promote weight loss and long term weight maintenance       Goals:   1. Work to increase to 3-4 small meals per day, with planned snacks as needed. Recommend following plate method for meal planning - focusing on lean protein, non-starchy vegetables, and measured amounts of starch. - Goal of  g protein and  g carbohydrate per day. - Recommend continuing protein supplement as meal replacement at least 1x/day OR as high protein snack option  2. Increase non caloric fluid to 64 oz per day. Eliminate caffeine, added sugar, carbonation, and straws.               -Continue to work to decrease sugar sweetened beverages - goal of calorie free beverages only              -Must eliminate caffeine prior to surgery and avoid for ~6-8 weeks   -Practice 30:30 rule,  food and flood   3. Start activity regimen, work to increase ADL  4.  Continue vitamin and mineral regimen   2 Complete Multivitamin per day  - Children's Complete Chewable OR Womens One-A-Days  OR Prenatal MVI  · Taken for LIFE   B12 ? 1000mcg for LIFE  ·  Two options for taking B12:  · sublingually (dissolves under your tongue) or monthly shot (prescription written by your PCP)  · Taken for LIFE    B Complex (B50 supercomlex)  · Taken for at least the first year after surgery   Vitamins D3  · 3000 international units per day    Calcium Citrate ? 1500 mg/d  broken down into 500 mg 3 times a day   · 3 Calcium Citrate Chewy Bites 3 times a day (sold in our office & online), OR  · Citrical 500-600 mg)3 times a day   · MUST BE TAKEN AT LEAST 2 HOURS AFTER YOUR MVI  · Taken for LIFE   Biotin ? 5000 mcg/day or labeled 5mg/day ? OPTIONAL          Candidate for surgery (per RD):  PENDING-not revision candidate at this time    Dietitian: Krish Kiser RD

## 2021-02-16 ENCOUNTER — VIRTUAL VISIT (OUTPATIENT)
Dept: SURGERY | Age: 66
End: 2021-02-16
Payer: COMMERCIAL

## 2021-02-16 VITALS — HEIGHT: 66 IN | BODY MASS INDEX: 35.03 KG/M2 | WEIGHT: 218 LBS

## 2021-02-16 DIAGNOSIS — E88.81 METABOLIC SYNDROME: Primary | ICD-10-CM

## 2021-02-16 DIAGNOSIS — Z98.84 S/P BARIATRIC SURGERY: ICD-10-CM

## 2021-02-16 DIAGNOSIS — E66.01 SEVERE OBESITY (BMI 35.0-35.9 WITH COMORBIDITY) (HCC): ICD-10-CM

## 2021-02-16 PROCEDURE — 99213 OFFICE O/P EST LOW 20 MIN: CPT | Performed by: NURSE PRACTITIONER

## 2021-02-16 NOTE — PATIENT INSTRUCTIONS
Patient Instructions 1. Remember hydration goals - minimum of 64 ounces of liquids per day (dehydration is the number one reason for hospital readmission). 2. Continue to monitor carbohydrate and protein intake you need a minimum of  Grams of protein daily- remember to keep your total carbohydrates to 50 grams or less per day for best results. 3. Continue to work towards exercise goals - 60-90 minutes, 5 times a week minimum of deliberate, aerobic exercise is the ultimate goal with strength training 2 times each week. Refer to [employer] for  information. 4. Remember to take vitamins as directed. 5. Attend support group the 2nd Thursday of each month. 6.  Constipation: Milk of Magnesia is for immediate relief only. Miralax is to be used every day if constipation is a chronic problem. 7.  Diarrhea: patients will occasionally develop lactose intolerance after surgery. Check to see if your protein shake has whey in it. If it does try a protein powder or drink that does not have whey and stop all yogurts, cheeses and milks to see if the diarrhea goes away. 8.  If you have had labs drawn. We will only call you if you have abnormal results. Otherwise you can access the lab results in \"mychart\". You will only need the access code the first time you sign on. 9.  Call us at [phone] or email us through [email or web address]" with questions,     concerns or worsening of condition, we have someone on call 24 hours a day. If you are unable to reach our office, you are to go to your Primary Care Physician or the Emergency Department. NOTE TO GASTRIC BYPASS PATIENTS:  (SAME APPLIES TO GASTRIC SLEEVE PATIENTS FOR FIRST TWO MONTHS) Remember that for the rest of your life, you are not able to take the following: 
- NSAIDs (ibuprofen, goody powder, BC powder, Motrin, Advil, Mobic, Voltaren, Excedrin, etc.) - Steroid pills or injections - Smoke (cigarettes or recreational drugs) - Alcohol Use of any of the above may cause ulcers in your stomach which may perforate causing a medical emergency and surgery. Speak to our medical staff if another medical provider requires you to take steroids or NSAIDs. Supplement Resource Guide Importance of Protein:  
Maintains lean body mass, produces antibodies to fight off infections, heals wounds, minimizes hair loss, helps to give you energy, helps with satiety, and keeping you full between meals. Importance of Calcium: 
Needed for healthy bones and teeth, normal blood clotting, and nervous system functioning, higher risk of osteoporosis and bone disease with non-compliance. Importance of Multivitamins: Many functions. Supply you with extra nutrients that you may be missing from food. May lead to iron deficiency anemia, weakness, fatigue, and many other symptoms with non-compliance. Importance of B Vitamins: 
Important for red blood cell formation, metabolism, energy, and helps to maintain a healthy nervous system. Protein Supplement Find one you like now. Use immediately after surgery. Look for: 
35-50g protein each day from your protein supplement once you reach the progression diet. 0-3 g fat per serving 0-3 g sugar per serving Protein drinks should be split in separate dosages. Recommend: Lifelong 1 year + Calcium Supplement:  
 
Start taking within a month after surgery. Look for: Calcium Citrate Plus D (1500 mg per day) Recommend: Citracal 
 
 . Avoid chocolate chewable calcium. Can use chewable bariatric or [employer] brand or similar chewable. The body cannot absorb more than 500-600 mg of calcium at a time. Take for Life Multi-vitamin Supplement:   
 
Start immediately after surgery: any complete chewable, such as: [place]s Complete chewables. Avoid Gepp sours or gummies. They lack iron and other important nutrients and also have added sugar. Continue with chewable vitamin or change to adult complete multivitamin one month after surgery. Menstruating women can take a prenatal vitamin. Make sure has at least 18 mg iron and 247-806 mcg folic acid Vitamin B12, B Complex Vitamin, and Biotin Start taking within a month after surgery. Vitamin B12:  1000 mcg of Vitamin B12 three times weekly Must take sublingually (meaning you take it under your tongue) or in a liquid drop form for easy absorption. B Complex Vitamin: Take a pill or liquid drop form once daily. Biotin: This vitamin can help prevent hair loss. Recommend 5mg  
(5000 mcg) a day Biotin is Optional  
 
 
 
 
  
Learning About Physical Activity What is physical activity? Physical activity is any kind of activity that gets your body moving. The types of physical activity that can help you get fit and stay healthy include: · Aerobic or \"cardio\" activities that make your heart beat faster and make you breathe harder, such as brisk walking, riding a bike, or running. Aerobic activities strengthen your heart and lungs and build up your endurance. · Strength training activities that make your muscles work against, or \"resist,\" something, such as lifting weights or doing push-ups. These activities help tone and strengthen your muscles. · Stretches that allow you to move your joints and muscles through their full range of motion. Stretching helps you be more flexible and avoid injury. What are the benefits of physical activity? Being active is one of the best things you can do for your health. It helps you to: · Feel stronger and have more energy to do all the things you like to do. · Focus better at school or work. · Feel, think, and sleep better. · Reach and stay at a healthy weight. · Lose fat and build lean muscle. · Lower your risk for serious health problems. · Keep your bones, muscles, and joints strong. How can you make physical activity part of your life? Get at least 30 minutes of exercise on most days of the week. Walking is a good choice. You also may want to do other activities, such as running, swimming, cycling, or playing tennis or team sports. Pick activities that you likeones that make your heart beat faster, your muscles stronger, and your muscles and joints more flexible. If you find more than one thing you like doing, do them all. You don't have to do the same thing every day. Get your heart pumping every day. Any activity that makes your heart beat faster and keeps it at that rate for a while counts. Here are some great ways to get your heart beating faster: · Go for a brisk walk, run, or bike ride. · Go for a hike or swim. · Go in-line skating. · Play a game of touch football, basketball, or soccer. · Ride a bike. · Play tennis or racquetball. · Climb stairs. Even some household chores can be aerobicjust do them at a faster pace. Vacuuming, raking or mowing the lawn, sweeping the garage, and washing and waxing the car all can help get your heart rate up. Strengthen your muscles during the week. You don't have to lift heavy weights or grow big, bulky muscles to get stronger. Doing a few simple activities that make your muscles work against, or \"resist,\" something can help you get stronger. For example, you can: · Do push-ups or sit-ups, which use your own body weight as resistance. · Lift weights or dumbbells or use stretch bands at home or in a gym or community center. Stretch your muscles often. Stretching will help you as you become more active. It can help you stay flexible, loosen tight muscles, and avoid injury. It can also help improve your balance and posture and can be a great way to relax. Be sure to stretch the muscles you'll be using when you work out. It's best to warm your muscles slightly before you stretch them. Walk or do some other light aerobic activity for a few minutes, and then start stretching. When you stretch your muscles: · Do it slowly. Stretching is not about going fast or making sudden movements. · Don't push or bounce during a stretch. · Hold each stretch for at least 15 to 30 seconds, if you can. You should feel a stretch in the muscle, but not pain. · Breathe out as you do the stretch. Then breathe in as you hold the stretch. Don't hold your breath. If you're worried about how more activity might affect your health, have a checkup before you start. Follow any special advice your doctor gives you for getting a smart start. Where can you learn more? Go to http://www.gray.com/ Enter V351 in the search box to learn more about \"Learning About Physical Activity. \" Current as of: January 16, 2020               Content Version: 12.6 © 2006-2020 [employer]. Care instructions adapted under license by [employer] (which disclaims liability or warranty for this information). If you have questions about a medical condition or this instruction, always ask your healthcare professional. [address] any warranty or liability for your use of this information.

## 2021-02-16 NOTE — PROGRESS NOTES
Medical Weight Loss Progress Note       CC: Weight Management    Ranjana Puckett is a 60 y.o. female who is here for her f/up provider visit for liraglutide. Noticed slight HA when advanced to 1.2 mg, but doesn't have one today. No GI adverse effects. Supposed to titrate to 1.8mg on Thursday. Starting weight: 222 lbs on 1/28/21  Previous weight 222 lbs  Current weight: 218 lbs  Weight loss goal: 170 lbs          Weight History  Ideal body weight: 58.2 kg (128 lb 3.2 oz)  Adjusted ideal body weight: 74.4 kg (164 lb 1.9 oz) (Based on [employer] Tables)    Wt Readings from Last 10 Encounters:   02/16/21 98.9 kg (218 lb)   02/11/21 100.7 kg (222 lb)   02/01/21 100.7 kg (222 lb)   01/28/21 100.7 kg (222 lb)   01/18/21 100.7 kg (222 lb)   01/06/21 101 kg (222 lb 11.2 oz)   12/14/20 97.5 kg (215 lb)   11/12/20 97.5 kg (215 lb)   04/13/16 97.5 kg (215 lb)   04/13/16 97.5 kg (215 lb)       Weight Metrics 2/16/2021 2/11/2021 2/1/2021 1/28/2021 1/18/2021 1/6/2021 12/14/2020   Weight 218 lb 222 lb 222 lb 222 lb 222 lb 222 lb 11.2 oz 215 lb   BMI 35.73 kg/m2 36.38 kg/m2 36.38 kg/m2 36.38 kg/m2 36.38 kg/m2 35.94 kg/m2 35.23 kg/m2         Medications Currently Taking  Outpatient Medications Marked as Taking for the 2/16/21 encounter (Virtual Visit) with Allen Lanier NP   Medication Sig Dispense Refill    cyanocobalamin (VITAMIN B-12) 1,000 mcg sublingual tablet Take 1,000 mcg by mouth daily.  nystatin (MYCOSTATIN) topical cream       liraglutide (VICTOZA) 0.6 mg/0.1 mL (18 mg/3 mL) pnij 0.6mg SC daily x 7d, then 1.2mg SC daily  Indications: prediabetes 3 Adjustable Dose Pre-filled Pen Syringe 0    Insulin Needles, Disposable, 32 gauge x 1/4\" ndle 1 SC daily 100 Pen Needle 0    gabapentin (NEURONTIN) 300 mg capsule       cephALEXin (KEFLEX) 500 mg capsule       magnesium oxide (MAG-OX) 400 mg tablet       coenzyme q10 (Co Q-10) 10 mg cap Take  by mouth.       Premarin 0.625 mg/gram vaginal cream       clobetasoL (TEMOVATE) 0.05 % ointment       cetirizine (ZYRTEC) 10 mg tablet       amLODIPine (NORVASC) 10 mg tablet Take  by mouth daily.  multivitamin (ONE A DAY) tablet Take 1 Tab by mouth daily.  lisinopril (PRINIVIL, ZESTRIL) 20 mg tablet Take  by mouth daily.  cholecalciferol (VITAMIN D3) 1,000 unit tablet Take  by mouth daily.  montelukast (SINGULAIR) 10 mg tablet Take 10 mg by mouth daily.  fexofenadine (ALLEGRA) 180 mg tablet Take  by mouth.  omega-3 fatty acids-vitamin e (FISH OIL) 1,000 mg cap Take 1 Cap by mouth.  therapeutic multivitamin (THERAGRAN) tablet Take 1 Tab by mouth daily.  b complex vitamins (B COMPLEX 1) tablet Take 1 Tab by mouth daily.  CALCIUM CITRATE/VITAMIN D3 (CALCIUM CITRATE + D PO) Take  by mouth. Review of Systems  Since your last visit, have you experienced any complications? yes  If yes, please list: slight HA    Are you taking an appetite suppressant? No, GLP-1  If so:  Do you need a refill? yes    HEENT: denies HA, vision changes  CV: denies CP, palpiations, dizziness, edema  Resp: denies SOB  Abd: denies abd pain, nausea vomiting, diarrhea    BP Readings from Last 3 Encounters:   01/06/21 (!) 170/77   04/13/16 145/80   02/10/16 137/87           Have you received any other medical care this week? no  If yes, where and for what? Have you discontinued or changed any medicine or dose of your medicine(s) since your last visit? no    If yes, where and for what? Diet  How many ounces of calorie-free liquids did you consume each day? 64 oz    How many calories each day? Not tracking calories  How many grams of protein each day? Not tracking, focusing on decreasing carbs  Any protein shakes or ND products? 0    Exercise  Aerobic exercise: 0 min  Resistance exercise: 0 workouts / week  Any discomfort?  no     If yes, where?       Objective  Visit Vitals  Ht 5' 5.5\" (1.664 m)   Wt 98.9 kg (218 lb)   BMI 35.73 kg/m² No LMP recorded. Patient has had a hysterectomy. Physical Exam    General appearance - well appearing and in no distress  Mental status - alert, oriented to person, place, and time  Pulmonary - normal respiratory effort  Abdomen - no obvious distention  Neurological - normal speech, no focal findings or movement disorder noted  Extremities - normal movement  Musculoskeletal - moving extremities without difficulty  Skin - no rashes, no suspicious skin lesions noted    Assessment / Plan    No diagnosis found. 1.  Weight management    stable   Progress was reviewed with patient     2. Monthly Goal   2-4 lbs/month   Discuss Isis Aaron could be signs of ketosis as she is decreasing carbs, continue to monitor, advance to 1.8mg once HA resolves, call office if no improvement   F/u 2 months    3. Medical monitoring schedule:   Monthly provider appointments   Refill medications yes    4. Labs    Latest results reviewed with patient   Routine monitoring labs ordered no      This visit with Ms Hill was performed under virtual telemedicine guidelines during the coronavirus (DGQIL-27) public health emergency on 2/16/21 in an interactive fashion using [employer]. They understand that this telemedicine encounter is a billable service, with coverage determined by their insurance carrier. They are aware that they may receive a bill and have provided verbal consent for this virtual visit. This visit was performed with the patient in their home environment and provider was present at [facility]. I have spent over 20 minutes on this visit  both prior to the visit reviewing the patients chart and with the patient face to face. I have reviewed their medical history, performed a telemedicine physical examination, and discussed the plan of action to date.   They understand that they will be asked to come to the office when our office is allowed normal patient interaction, as dictated by public health officials, for a face-to-face visit to rednicolasuss all of the things we have talked about today.

## 2021-02-16 NOTE — PROGRESS NOTES
Pt has a virtual appt with Daphnie Coombs this morning. She stated that she is at work, and not sure if the internet is going to work. I told her that I will forward this to Daphnie Coombs. Pt stated that she is doing well. Speaking Coherently

## 2021-04-19 ENCOUNTER — VIRTUAL VISIT (OUTPATIENT)
Dept: SURGERY | Age: 66
End: 2021-04-19
Payer: COMMERCIAL

## 2021-04-19 VITALS — HEIGHT: 66 IN | WEIGHT: 209 LBS | BODY MASS INDEX: 33.59 KG/M2

## 2021-04-19 DIAGNOSIS — Z98.84 S/P BARIATRIC SURGERY: ICD-10-CM

## 2021-04-19 DIAGNOSIS — E88.81 METABOLIC SYNDROME: Primary | ICD-10-CM

## 2021-04-19 DIAGNOSIS — K90.9 INTESTINAL MALABSORPTION, UNSPECIFIED TYPE: ICD-10-CM

## 2021-04-19 PROCEDURE — 99213 OFFICE O/P EST LOW 20 MIN: CPT | Performed by: NURSE PRACTITIONER

## 2021-04-19 RX ORDER — BUTALBITAL, ACETAMINOPHEN AND CAFFEINE 50; 325; 40 MG/1; MG/1; MG/1
TABLET ORAL
COMMUNITY
Start: 2021-04-15 | End: 2021-07-22 | Stop reason: ALTCHOICE

## 2021-04-19 RX ORDER — BLOOD SUGAR DIAGNOSTIC
STRIP MISCELLANEOUS
Qty: 100 PEN NEEDLE | Refills: 0 | Status: SHIPPED | OUTPATIENT
Start: 2021-04-19 | End: 2021-08-23 | Stop reason: SDUPTHER

## 2021-04-19 NOTE — PATIENT INSTRUCTIONS
Patient Instructions 1. Remember hydration goals - minimum of 64 ounces of liquids per day (dehydration is the number one reason for hospital readmission). 2. Continue to monitor carbohydrate and protein intake you need a minimum of  Grams of protein daily- remember to keep your total carbohydrates to 50 grams or less per day for best results. 3. Continue to work towards exercise goals - 60-90 minutes, 5 times a week minimum of deliberate, aerobic exercise is the ultimate goal with strength training 2 times each week. Refer to [email or web address] for  information. 4. Remember to take vitamins as directed. 5. Attend support group the 2nd Thursday of each month. 6.  Constipation: Milk of Magnesia is for immediate relief only. Miralax is to be used every day if constipation is a chronic problem. 7.  Diarrhea: patients will occasionally develop lactose intolerance after surgery. Check to see if your protein shake has whey in it. If it does try a protein powder or drink that does not have whey and stop all yogurts, cheeses and milks to see if the diarrhea goes away. 8.  If you have had labs drawn. We will only call you if you have abnormal results. Otherwise you can access the lab results in \"mychart\". You will only need the access code the first time you sign on. 9.  Call us at [phone] or email us through [email or web address]" with questions,     concerns or worsening of condition, we have someone on call 24 hours a day. If you are unable to reach our office, you are to go to your Primary Care Physician or the Emergency Department. NOTE TO GASTRIC BYPASS PATIENTS:  (SAME APPLIES TO GASTRIC SLEEVE PATIENTS FOR FIRST TWO MONTHS) Remember that for the rest of your life, you are not able to take the following: 
- NSAIDs (ibuprofen, goody powder, BC powder, Motrin, Advil, Mobic, Voltaren, Excedrin, etc.) - Steroid pills or injections - Smoke (cigarettes or recreational drugs) - Alcohol Use of any of the above may cause ulcers in your stomach which may perforate causing a medical emergency and surgery. Speak to our medical staff if another medical provider requires you to take steroids or NSAIDs. Supplement Resource Guide Importance of Protein:  
Maintains lean body mass, produces antibodies to fight off infections, heals wounds, minimizes hair loss, helps to give you energy, helps with satiety, and keeping you full between meals. Importance of Calcium: 
Needed for healthy bones and teeth, normal blood clotting, and nervous system functioning, higher risk of osteoporosis and bone disease with non-compliance. Importance of Multivitamins: Many functions. Supply you with extra nutrients that you may be missing from food. May lead to iron deficiency anemia, weakness, fatigue, and many other symptoms with non-compliance. Importance of B Vitamins: 
Important for red blood cell formation, metabolism, energy, and helps to maintain a healthy nervous system. Protein Supplement Find one you like now. Use immediately after surgery. Look for: 
35-50g protein each day from your protein supplement once you reach the progression diet. 0-3 g fat per serving 0-3 g sugar per serving Protein drinks should be split in separate dosages. Recommend: Lifelong 1 year + Calcium Supplement:  
 
Start taking within a month after surgery. Look for: Calcium Citrate Plus D (1500 mg per day) Recommend: Citracal 
 
 . Avoid chocolate chewable calcium. Can use chewable bariatric or [employer] brand or similar chewable. The body cannot absorb more than 500-600 mg of calcium at a time. Take for Life Multi-vitamin Supplement:   
 
Start immediately after surgery: any complete chewable, such as: [place]s Complete chewables. Avoid [place] sours or gummies.   They lack iron and other important nutrients and also have added sugar. Continue with chewable vitamin or change to adult complete multivitamin one month after surgery. Menstruating women can take a prenatal vitamin. Make sure has at least 18 mg iron and 096-598 mcg folic acid Vitamin B12, B Complex Vitamin, and Biotin Start taking within a month after surgery. Vitamin B12:  1000 mcg of Vitamin B12 three times weekly Must take sublingually (meaning you take it under your tongue) or in a liquid drop form for easy absorption. B Complex Vitamin: Take a pill or liquid drop form once daily. Biotin: This vitamin can help prevent hair loss. Recommend 5mg  
(5000 mcg) a day Biotin is Optional  
 
 
 
 
  
Learning About Being Physically Active What is physical activity? Being physically active means doing any kind of activity that gets your body moving. The types of physical activity that can help you get fit and stay healthy include: · Aerobic or \"cardio\" activities. These make your heart beat faster and make you breathe harder, such as brisk walking, riding a bike, or running. They strengthen your heart and lungs and build up your endurance. · Strength training activities. These make your muscles work against, or \"resist,\" something. Examples include lifting weights or doing push-ups. These activities help tone and strengthen your muscles and bones. · Stretches. These let you move your joints and muscles through their full range of motion. Stretching helps you be more flexible. What are the benefits of being active? Being active is one of the best things you can do for your health. It helps you to: · Feel stronger and have more energy to do all the things you like to do. · Focus better at school or work. · Feel, think, and sleep better. · Reach and stay at a healthy weight. · Lose fat and build lean muscle. · Lower your risk for serious health problems, including diabetes, heart attack, high blood pressure, and some cancers. · Keep your heart, lungs, bones, muscles, and joints strong and healthy. How can you make being active part of your life? Start slowly. Make it your long-term goal to get at least 30 minutes of exercise on most days of the week. Walking is a good choice. You also may want to do other activities, such as running, swimming, cycling, or playing tennis or team sports. Pick activities that you likeones that make your heart beat faster, your muscles stronger, and your muscles and joints more flexible. If you find more than one thing you like doing, do them all. You don't have to do the same thing every day. Get your heart pumping every day. Any activity that makes your heart beat faster and keeps it at that rate for a while counts. Here are some great ways to get your heart beating faster: · Go for a brisk walk, run, or bike ride. · Go for a hike or swim. · Go in-line skating. · Play a game of touch football, basketball, or soccer. · Ride a bike. · Play tennis or racquetball. · Climb stairs. Even some household chores can be aerobicjust do them at a faster pace. Vacuuming, raking or mowing the lawn, sweeping the garage, and washing and waxing the car all can help get your heart rate up. Strengthen your muscles during the week. You don't have to lift heavy weights or grow big, bulky muscles to get stronger. Doing a few simple activities that make your muscles work against, or \"resist,\" something can help you get stronger. For example, you can: · Do push-ups or sit-ups, which use your own body weight as resistance. · Lift weights or dumbbells or use stretch bands at home or in a gym or community center. Stretch your muscles often. Stretching will help you as you become more active. It can help you stay flexible, loosen tight muscles, and avoid injury. It can also help improve your balance and posture and can be a great way to relax. Be sure to stretch the muscles you'll be using when you work out.  It's best to warm your muscles slightly before you stretch them. Walk or do some other light aerobic activity for a few minutes, and then start stretching. When you stretch your muscles: · Do it slowly. Stretching is not about going fast or making sudden movements. · Don't push or bounce during a stretch. · Hold each stretch for at least 15 to 30 seconds, if you can. You should feel a stretch in the muscle, but not pain. · Breathe out as you do the stretch. Then breathe in as you hold the stretch. Don't hold your breath. If you're worried about how more activity might affect your health, have a checkup before you start. Follow any special advice your doctor gives you for getting a smart start. Where can you learn more? Go to http://www.gray.com/ Enter F398 in the search box to learn more about \"Learning About Being Physically Active. \" Current as of: September 10, 2020               Content Version: 12.8 © 2006-2021 [employer]. Care instructions adapted under license by [employer] (which disclaims liability or warranty for this information). If you have questions about a medical condition or this instruction, always ask your healthcare professional. [address] any warranty or liability for your use of this information.

## 2021-04-19 NOTE — PROGRESS NOTES
Medical Weight Loss Progress Note       CC: Weight Management    Nereida Goodell is a 60 y.o. female who is here for her f/up provider visit for liraglutide. No adverse effects. Titrated up to 1.8mg. Having pain with injection sites on lateral abdomen    Starting weight: 222 lbs on 1/28/21  Previous weight 218 lbs  Current weight: 209 lbs  % wt loss: 6%  Weight loss goal: 170 lbs    Weight History  Ideal body weight: 58.2 kg (128 lb 3.2 oz)  Adjusted ideal body weight: 72.8 kg (160 lb 8.3 oz) (Based on [employer] Tables)    Wt Readings from Last 10 Encounters:   04/19/21 94.8 kg (209 lb)   02/16/21 98.9 kg (218 lb)   02/11/21 100.7 kg (222 lb)   02/01/21 100.7 kg (222 lb)   01/28/21 100.7 kg (222 lb)   01/18/21 100.7 kg (222 lb)   01/06/21 101 kg (222 lb 11.2 oz)   12/14/20 97.5 kg (215 lb)   11/12/20 97.5 kg (215 lb)   04/13/16 97.5 kg (215 lb)       Weight Metrics 4/19/2021 2/16/2021 2/11/2021 2/1/2021 1/28/2021 1/18/2021 1/6/2021   Weight 209 lb 218 lb 222 lb 222 lb 222 lb 222 lb 222 lb 11.2 oz   BMI 34.25 kg/m2 35.73 kg/m2 36.38 kg/m2 36.38 kg/m2 36.38 kg/m2 36.38 kg/m2 35.94 kg/m2         Medications Currently Taking  Outpatient Medications Marked as Taking for the 4/19/21 encounter (Virtual Visit) with Ellin Buerger, NP   Medication Sig Dispense Refill    butalbital-acetaminophen-caffeine (FIORICET, ESGIC) -40 mg per tablet       liraglutide (VICTOZA) 0.6 mg/0.1 mL (18 mg/3 mL) pnij 1.8 mg by SubCUTAneous route daily. Indications: prediabetes 3 Adjustable Dose Pre-filled Pen Syringe 2    cyanocobalamin (VITAMIN B-12) 1,000 mcg sublingual tablet Take 1,000 mcg by mouth daily.  nystatin (MYCOSTATIN) topical cream       Insulin Needles, Disposable, 32 gauge x 1/4\" ndle 1 SC daily 100 Pen Needle 0    gabapentin (NEURONTIN) 300 mg capsule       cephALEXin (KEFLEX) 500 mg capsule       magnesium oxide (MAG-OX) 400 mg tablet       coenzyme q10 (Co Q-10) 10 mg cap Take  by mouth.       Premarin 0.625 mg/gram vaginal cream       clobetasoL (TEMOVATE) 0.05 % ointment       cetirizine (ZYRTEC) 10 mg tablet       amLODIPine (NORVASC) 10 mg tablet Take  by mouth daily.  multivitamin (ONE A DAY) tablet Take 1 Tab by mouth daily.  lisinopril (PRINIVIL, ZESTRIL) 20 mg tablet Take  by mouth daily.  cholecalciferol (VITAMIN D3) 1,000 unit tablet Take  by mouth daily.  montelukast (SINGULAIR) 10 mg tablet Take 10 mg by mouth daily.  fexofenadine (ALLEGRA) 180 mg tablet Take  by mouth.  omega-3 fatty acids-vitamin e (FISH OIL) 1,000 mg cap Take 1 Cap by mouth.  b complex vitamins (B COMPLEX 1) tablet Take 1 Tab by mouth daily.  CALCIUM CITRATE/VITAMIN D3 (CALCIUM CITRATE + D PO) Take  by mouth. Review of Systems  Since your last visit, have you experienced any complications? yes  If yes, please list: pain at injection sites on lateral abdomen    Are you taking an appetite suppressant? No, GLP-1If so:  Do you need a refill? yes    HEENT: denies HA, vision changes  CV: denies CP, palpiations, dizziness, edema  Resp: denies SOB  Abd: denies abd pain, nausea vomiting, diarrhea    BP Readings from Last 3 Encounters:   01/06/21 (!) 170/77   04/13/16 145/80   02/10/16 137/87           Have you received any other medical care this week? no  If yes, where and for what? Have you discontinued or changed any medicine or dose of your medicine(s) since your last visit? no    If yes, where and for what? Diet  How many ounces of calorie-free liquids did you consume each day? 64 oz    How many calories each day? 1200 calories  How many grams of protein each day? Not tracking  Any protein shakes or ND products? 1 Nutrisystem shake    Exercise  Aerobic exercise: increasing her walking  Resistance exercise: 0 workouts / week  Any discomfort?  no     If yes, where?       Objective  Visit Vitals  Ht 5' 5.5\" (1.664 m)   Wt 94.8 kg (209 lb)   BMI 34.25 kg/m²     No LMP recorded. Patient has had a hysterectomy. Reviewed [facility] in [facility] from 3/15/21:  Crt 0.7  K 4.5  HgA1c 5.7  LFTs WNL  Hgb 12.6    Physical Exam    General appearance - well appearing and in no distress  Mental status - alert, oriented to person, place, and time  Pulmonary - normal respiratory effort  Abdomen - no obvious distention  Neurological - normal speech, no focal findings or movement disorder noted  Extremities - normal movement  Musculoskeletal - moving extremities without difficulty  Skin - no rashes, no suspicious skin lesions noted    Assessment / Plan    Encounter Diagnoses   Name Primary?  Metabolic syndrome Yes    Intestinal malabsorption, unspecified type     S/P bariatric surgery        1. Weight management    well controlled   Progress was reviewed with patient   Discussed rotating sites around central abdomen, make sure pen at vertical position     2. Monthly Goal   3-5 lbs/month    3. Medical monitoring schedule:   Monthly provider appointments   Refill medications yes    4. Labs    Latest results reviewed with patient   Routine monitoring labs ordered no      This visit with Ms Edith Mcnally was performed under virtual telemedicine guidelines during the coronavirus (VZMNZ-95) public health emergency on 4/20/21 in an interactive fashion using [employer]. me. Misty Diaz understand that this telemedicine encounter is a billable service, with coverage determined by their insurance carrier. Misty Diaz are aware that they may receive a bill and have provided verbal consent for this virtual visit. This visit was performed with the patient in their home environment and provider was present at [facility]. I have spent over 20 minutes on this visit  both prior to the visit reviewing the patients chart and with the patient face to face.  I have reviewed their medical history, performed a telemedicine physical examination, and discussed the plan of action to date. Misty Diaz understand that they will be asked to come to the office when our office is allowed normal patient interaction, as dictated by public health officials, for a face-to-face visit to rediscuss all of the things we have talked about today.

## 2021-07-12 ENCOUNTER — DOCUMENTATION ONLY (OUTPATIENT)
Dept: SURGERY | Age: 66
End: 2021-07-12

## 2021-07-22 ENCOUNTER — OFFICE VISIT (OUTPATIENT)
Dept: SURGERY | Age: 66
End: 2021-07-22
Payer: MEDICARE

## 2021-07-22 ENCOUNTER — HOSPITAL ENCOUNTER (OUTPATIENT)
Dept: LAB | Age: 66
Discharge: HOME | End: 2021-07-22
Payer: MEDICARE

## 2021-07-22 VITALS
WEIGHT: 197.1 LBS | BODY MASS INDEX: 31.68 KG/M2 | HEIGHT: 66 IN | OXYGEN SATURATION: 100 % | DIASTOLIC BLOOD PRESSURE: 76 MMHG | HEART RATE: 72 BPM | SYSTOLIC BLOOD PRESSURE: 137 MMHG

## 2021-07-22 DIAGNOSIS — E88.81 METABOLIC SYNDROME: ICD-10-CM

## 2021-07-22 DIAGNOSIS — K90.9 INTESTINAL MALABSORPTION, UNSPECIFIED TYPE: Primary | ICD-10-CM

## 2021-07-22 DIAGNOSIS — Z98.84 S/P BARIATRIC SURGERY: ICD-10-CM

## 2021-07-22 DIAGNOSIS — I10 ESSENTIAL HYPERTENSION: ICD-10-CM

## 2021-07-22 LAB
25(OH)D3 SERPL-MCNC: 38.8 NG/ML (ref 30–100)
ALBUMIN SERPL-MCNC: 4.4 G/DL (ref 3.4–5)
ALBUMIN/GLOB SERPL: 1.5 {RATIO} (ref 0.8–1.7)
ALP SERPL-CCNC: 93 U/L (ref 45–117)
ALT SERPL-CCNC: 53 U/L (ref 13–56)
ANION GAP SERPL CALC-SCNC: 1 MMOL/L (ref 3–18)
AST SERPL-CCNC: 34 U/L (ref 10–38)
BASOPHILS # BLD: 0 K/UL (ref 0–0.1)
BASOPHILS NFR BLD: 0 % (ref 0–2)
BILIRUB SERPL-MCNC: 0.4 MG/DL (ref 0.2–1)
BUN SERPL-MCNC: 13 MG/DL (ref 7–18)
BUN/CREAT SERPL: 19 (ref 12–20)
CALCIUM SERPL-MCNC: 9.5 MG/DL (ref 8.5–10.1)
CHLORIDE SERPL-SCNC: 111 MMOL/L (ref 100–111)
CO2 SERPL-SCNC: 30 MMOL/L (ref 21–32)
CREAT SERPL-MCNC: 0.69 MG/DL (ref 0.6–1.3)
DIFFERENTIAL METHOD BLD: ABNORMAL
EOSINOPHIL # BLD: 0.2 K/UL (ref 0–0.4)
EOSINOPHIL NFR BLD: 3 % (ref 0–5)
ERYTHROCYTE [DISTWIDTH] IN BLOOD BY AUTOMATED COUNT: 14.5 % (ref 11.6–14.5)
FERRITIN SERPL-MCNC: 17 NG/ML (ref 8–388)
FOLATE SERPL-MCNC: >20 NG/ML (ref 3.1–17.5)
GLOBULIN SER CALC-MCNC: 3 G/DL (ref 2–4)
GLUCOSE SERPL-MCNC: 77 MG/DL (ref 74–99)
HCT VFR BLD AUTO: 41.7 % (ref 35–45)
HGB BLD-MCNC: 13.4 G/DL (ref 12–16)
IRON SERPL-MCNC: 63 UG/DL (ref 50–175)
LYMPHOCYTES # BLD: 1.6 K/UL (ref 0.9–3.6)
LYMPHOCYTES NFR BLD: 27 % (ref 21–52)
MCH RBC QN AUTO: 28.8 PG (ref 24–34)
MCHC RBC AUTO-ENTMCNC: 32.1 G/DL (ref 31–37)
MCV RBC AUTO: 89.7 FL (ref 74–97)
MONOCYTES # BLD: 0.6 K/UL (ref 0.05–1.2)
MONOCYTES NFR BLD: 10 % (ref 3–10)
NEUTS SEG # BLD: 3.6 K/UL (ref 1.8–8)
NEUTS SEG NFR BLD: 60 % (ref 40–73)
PLATELET # BLD AUTO: 500 K/UL (ref 135–420)
PMV BLD AUTO: 9.6 FL (ref 9.2–11.8)
POTASSIUM SERPL-SCNC: 3.9 MMOL/L (ref 3.5–5.5)
PROT SERPL-MCNC: 7.4 G/DL (ref 6.4–8.2)
RBC # BLD AUTO: 4.65 M/UL (ref 4.2–5.3)
SODIUM SERPL-SCNC: 142 MMOL/L (ref 136–145)
VIT B12 SERPL-MCNC: 964 PG/ML (ref 211–911)
WBC # BLD AUTO: 5.9 K/UL (ref 4.6–13.2)

## 2021-07-22 PROCEDURE — 82728 ASSAY OF FERRITIN: CPT

## 2021-07-22 PROCEDURE — 82306 VITAMIN D 25 HYDROXY: CPT

## 2021-07-22 PROCEDURE — 80053 COMPREHEN METABOLIC PANEL: CPT

## 2021-07-22 PROCEDURE — 82607 VITAMIN B-12: CPT

## 2021-07-22 PROCEDURE — 85025 COMPLETE CBC W/AUTO DIFF WBC: CPT

## 2021-07-22 PROCEDURE — 36415 COLL VENOUS BLD VENIPUNCTURE: CPT

## 2021-07-22 PROCEDURE — 84425 ASSAY OF VITAMIN B-1: CPT

## 2021-07-22 PROCEDURE — 83540 ASSAY OF IRON: CPT

## 2021-07-22 PROCEDURE — 99214 OFFICE O/P EST MOD 30 MIN: CPT | Performed by: NURSE PRACTITIONER

## 2021-07-22 NOTE — PROGRESS NOTES
Subjective:   Meghan Villarreal  is a 73 y.o. female who presents for follow-up about 13 years following laparoscopic gastric bypass surgery. Surgery related complication: NA. She has lost a total of 63 pounds since surgery. Body mass index is 32.3 kg/m². Ryan Davis Loss of EBW is 50%. Has been on liraglutide for medical weight loss since Jan 2021 after found not to be a candidate for revision or band over bypass. Has lost 25 lbs with the medication in 6 months. Starting weight: 222 lbs on 1/28/21  Previous weight 209 lbs  Current weight: 197 lbs  % wt loss: 11%  Weight loss goal: 170 lbs    The patient presents today to assess their progress toward their weight loss goal & to address any issues that may be present:   She is tolerating solid foods without difficulty, reports occ wheals after injection on lower abdomen only. And  denies vomiting, abdominal pain, diarrhea and reflux. Fluid intake:  good                              Protein intake:  good, backed off shakes, some Nutrisystem products; eggs, oliver, hamburger mariano, chicken  Eating regularly. The patient is taking recommended vitamins. The patient's exercise level: decreased activity. Backed off her walking with recent move    Changes in her medical history and medications have been reviewed.     Patient Active Problem List   Diagnosis Code    HTN (hypertension) I10    Asthma J45.909    S/P bariatric surgery Z98.84    Intestinal malabsorption K90.9    Obese E66.9    Severe obesity (BMI 35.0-35.9 with comorbidity) (HCC) E66.01, Z68.35    DJD (degenerative joint disease) M19.90    Hypercholesteremia Y86.82    Metabolic syndrome B20.38     Past Medical History:   Diagnosis Date    Asthma 2/18/2011    DJD (degenerative joint disease) 2/18/2011    HTN (hypertension) 2/18/2011    Hypercholesteremia 2/18/2011    Intestinal malabsorption     expected with gastric bypass    Obese     Obesity, Class II, BMI 35-39.9, with comorbidity     Prediabetes     HgA1c 6.0%    Status post gastric bypass for obesity     2007 / judy     Past Surgical History:   Procedure Laterality Date    HX BLADDER SUSPENSION      HX GYN      removal remaining ovary after hysterectomy    HX HYSTERECTOMY      HX ORTHOPAEDIC      achilles tendon repair    [place] LAP GASTRIC BYPASS/LANA-EN-Y  1/2/07    Liver bx, extensive lysis of adhesions     Current Outpatient Medications   Medication Sig Dispense Refill    Insulin Needles, Disposable, 32 gauge x 1/4\" ndle 1 SC daily 100 Pen Needle 0    liraglutide (VICTOZA) 0.6 mg/0.1 mL (18 mg/3 mL) pnij 1.8 mg by SubCUTAneous route daily. Indications: prediabetes 3 Adjustable Dose Pre-filled Pen Syringe 2    cyanocobalamin (VITAMIN B-12) 1,000 mcg sublingual tablet Take 1,000 mcg by mouth daily.  nystatin (MYCOSTATIN) topical cream       gabapentin (NEURONTIN) 300 mg capsule       cephALEXin (KEFLEX) 500 mg capsule       magnesium oxide (MAG-OX) 400 mg tablet       coenzyme q10 (Co Q-10) 10 mg cap Take  by mouth.  Premarin 0.625 mg/gram vaginal cream       clobetasoL (TEMOVATE) 0.05 % ointment       cetirizine (ZYRTEC) 10 mg tablet       amLODIPine (NORVASC) 10 mg tablet Take  by mouth daily.  multivitamin (ONE A DAY) tablet Take 1 Tab by mouth daily.  lisinopril (PRINIVIL, ZESTRIL) 20 mg tablet Take  by mouth daily.  cholecalciferol (VITAMIN D3) 1,000 unit tablet Take  by mouth daily.  montelukast (SINGULAIR) 10 mg tablet Take 10 mg by mouth daily.  fexofenadine (ALLEGRA) 180 mg tablet Take  by mouth.  omega-3 fatty acids-vitamin e (FISH OIL) 1,000 mg cap Take 1 Cap by mouth.  b complex vitamins (B COMPLEX 1) tablet Take 1 Tab by mouth daily.  CALCIUM CITRATE/VITAMIN D3 (CALCIUM CITRATE + D PO) Take  by mouth.            Review of Systems:  General - Denies fatigue, fever, chills  Cardiac - Denies chest pain, palpitations, shortness of breath  Pulmonary - Denies shortness of breath, productive cough  GI - as noted above  Musculoskeletal - Denies joint or muscular weakness, pain, stiffness  Hematologic - Denies abnormal bleeding, bruising  Neurologic -  Denies weakness, paralysis, numbness, tingling    Objective:     Visit Vitals  /76 (BP 1 Location: Left upper arm, BP Patient Position: Sitting, BP Cuff Size: Adult long)   Pulse 72   Ht 5' 5.5\" (1.664 m)   Wt 89.4 kg (197 lb 1.6 oz)   SpO2 100%   BMI 32.30 kg/m²        Physical Exam:      General appearance:  alert, cooperative, no distress, appears stated age   Mental status   alert, oriented to person, place, and time   Neck  supple, no significant adenopathy     Lymphatics  no palpable lymphadenopathy, no hepatosplenomegaly   Chest  clear to auscultation, no wheezes, rales or rhonchi, symmetric air entry   Heart  normal rate, regular rhythm, normal S1, S2, no murmurs, rubs, clicks or gallops    Abdomen: soft, nontender, nondistended, no masses or organomegaly   Incision:  well healed, no hernias      Neurological  alert, oriented, normal speech, no focal findings or movement disorder noted   Musculoskeletal no joint tenderness, deformity or swelling   Extremities peripheral pulses normal, no pedal edema, no clubbing or cyanosis   Skin normal coloration and turgor, no rashes, no suspicious skin lesions noted      Labs:     No results found for this or any previous visit (from the past 2016 hour(s)). Assessment and Plan:   1. Intestinal malabsorption  a. continue required Vitamins: B12, B complex, D, iron, calcium, multivitamin  2. S/p laparoscopic bariatric surgery, GASTRIC BYPASS, history of morbid obesity. Reviewed weight loss progress since adding GLP-1 and done well. Recommend using food tracking teresa to ensure adequate protein and caloric intake. Aim for 80-90 g protein daily and 800-1000 calories. Needs to increase exercise again.  Discussed changing angle of medication administration to see if helps with joanna a. Sleep goal is 7-9 hours each night. Patient education given on the effects of sleep deprivation on weight control. b. Discussed patients weight loss goals and dietary choices in relation to goals. c. Reminded to measure portions, continue high protein, low carbohydrate diet. Reminded to eat regularly, to eat slowly & not to drink with meals. d. Continue cardio exercise and add resistance exercises. 60-90 minutes of aerobic activity 5 days a week and strength training 2 days each week. e. Encouraged to attend support group   f. Required fluid intake is >64oz daily of decaffeinated sugar free beverages. Labs ordered today  Follow up in 3 months or sooner if patient has questions, concerns or worsening of condition, if unable to reach our office, patient should report to the ED. Ms. Andre Santizo has a reminder for a \"due or due soon\" health maintenance. I have asked that she contact her primary care provider for a follow-up on this health maintenance.      Total time spent with patient was 30 minutes

## 2021-07-22 NOTE — PATIENT INSTRUCTIONS
Consider reading Mindful Eating    Dr. Arabella Brown  [phone]  [address], [address], [address]  [facility] 1946    Dr. Terra Garland  [phone]  [place] [address], [address]  [address]  [phone]  [place], [address], [address], [address]  [phone] [address], [place], [address] [address]    National Eating Disorders Hotline [phone]    [facility]   https://oatidewater. org/index.html  of Overeaters Anonymous                                                              Patient Instructions      1. Remember hydration goals - minimum of 64 ounces of liquids per day (dehydration is the number one reason for hospital readmission). 2. Continue to monitor carbohydrate and protein intake you need a minimum of  Grams of protein daily- remember to keep your total carbohydrates to 50 grams or less per day for best results. 3. Continue to work towards exercise goals - 60-90 minutes, 5 times a week minimum of deliberate, aerobic exercise is the ultimate goal with strength training 2 times each week. Refer to [employer] for  information. 4. Remember to take vitamins as directed. 5. Attend support group the 2nd Thursday of each month. 6.  Constipation: Milk of Magnesia is for immediate relief only. Miralax is to be used every day if constipation is a chronic problem. 7.  Diarrhea: patients will occasionally develop lactose intolerance after surgery. Check to see if your protein shake has whey in it. If it does try a protein powder or drink that does not have whey and stop all yogurts, cheeses and milks to see if the diarrhea goes away. 8.  If you have had labs drawn.   We will only call you if you have abnormal results. Otherwise you can access the lab results in \"mychart\". You will only need the access code the first time you sign on. 9.  Call us at [phone] or email us through [facility]" with questions,     concerns or worsening of condition, we have someone on call 24 hours a day. If you are unable to reach our office, you are to go to your Primary Care Physician or the Emergency Department. NOTE TO GASTRIC BYPASS PATIENTS:  (SAME APPLIES TO GASTRIC SLEEVE PATIENTS FOR FIRST TWO MONTHS)  Remember that for the rest of your life, you are not able to take the following:  - NSAIDs (ibuprofen, goody powder, BC powder, Motrin, Advil, Mobic, Voltaren, Excedrin, etc.)  - Steroid pills or injections  - Smoke (cigarettes or recreational drugs)  - Alcohol  Use of any of the above may cause ulcers in your stomach which may perforate causing a medical emergency and surgery. Speak to our medical staff if another medical provider requires you to take steroids or NSAIDs. Supplement Resource Guide    Importance of Protein:   Maintains lean body mass, produces antibodies to fight off infections, heals wounds, minimizes hair loss, helps to give you energy, helps with satiety, and keeping you full between meals. Importance of Calcium:  Needed for healthy bones and teeth, normal blood clotting, and nervous system functioning, higher risk of osteoporosis and bone disease with non-compliance. Importance of Multivitamins: Many functions. Supply you with extra nutrients that you may be missing from food. May lead to iron deficiency anemia, weakness, fatigue, and many other symptoms with non-compliance. Importance of B Vitamins:  Important for red blood cell formation, metabolism, energy, and helps to maintain a healthy nervous system. Protein Supplement  Find one you like now. Use immediately after surgery.    Look for:  35-50g protein each day from your protein supplement once you reach the progression diet. 0-3 g fat per serving  0-3 g sugar per serving    Protein drinks should be split in separate dosages. Recommend: Lifelong  1 year + Calcium Supplement:     Start taking within a month after surgery. Look for: Calcium Citrate Plus D (1500 mg per day)  Recommend: Citracal     .            Avoid chocolate chewable calcium. Can use chewable bariatric or [employer] brand or similar chewable. The body cannot absorb more than 500-600 mg of calcium at a time. Take for Life Multi-vitamin Supplement:      Start immediately after surgery: any complete chewable, such as: [place]s Complete chewables. Avoid [place] sours or gummies. They lack iron and other important nutrients and also have added sugar. Continue with chewable vitamin or change to adult complete multivitamin one month after surgery. Menstruating women can take a prenatal vitamin. Make sure has at least 18 mg iron and 534-416 mcg folic acid   Vitamin R91, B Complex Vitamin, and Biotin  Start taking within a month after surgery. Vitamin B12:  1000 mcg of Vitamin B12 three times weekly    Must take sublingually (meaning you take it under your tongue) or in a liquid drop form for easy absorption. B Complex Vitamin: Take a pill or liquid drop form once daily. Biotin: This vitamin can help prevent hair loss. Recommend 5mg   (5000 mcg) a day  Biotin is Optional              Learning About Being Physically Active  What is physical activity? Being physically active means doing any kind of activity that gets your body moving. The types of physical activity that can help you get fit and stay healthy include:  · Aerobic or \"cardio\" activities. These make your heart beat faster and make you breathe harder, such as brisk walking, riding a bike, or running. They strengthen your heart and lungs and build up your endurance. · Strength training activities. These make your muscles work against, or \"resist,\" something. Examples include lifting weights or doing push-ups. These activities help tone and strengthen your muscles and bones. · Stretches. These let you move your joints and muscles through their full range of motion. Stretching helps you be more flexible. What are the benefits of being active? Being active is one of the best things you can do for your health. It helps you to:  · Feel stronger and have more energy to do all the things you like to do. · Focus better at school or work. · Feel, think, and sleep better. · Reach and stay at a healthy weight. · Lose fat and build lean muscle. · Lower your risk for serious health problems, including diabetes, heart attack, high blood pressure, and some cancers. · Keep your heart, lungs, bones, muscles, and joints strong and healthy. How can you make being active part of your life? Start slowly. Make it your long-term goal to get at least 30 minutes of exercise on most days of the week. Walking is a good choice. You also may want to do other activities, such as running, swimming, cycling, or playing tennis or team sports. Pick activities that you like--ones that make your heart beat faster, your muscles stronger, and your muscles and joints more flexible. If you find more than one thing you like doing, do them all. You don't have to do the same thing every day. Get your heart pumping every day. Any activity that makes your heart beat faster and keeps it at that rate for a while counts. Here are some great ways to get your heart beating faster:  · Go for a brisk walk, run, or bike ride. · Go for a hike or swim. · Go in-line skating. · Play a game of touch football, basketball, or soccer. · Ride a bike. · Play tennis or racquetball. · Climb stairs. Even some household chores can be aerobic--just do them at a faster pace. Vacuuming, raking or mowing the lawn, sweeping the garage, and washing and waxing the car all can help get your heart rate up.   Strengthen your muscles during the week. You don't have to lift heavy weights or grow big, bulky muscles to get stronger. Doing a few simple activities that make your muscles work against, or \"resist,\" something can help you get stronger. For example, you can:  · Do push-ups or sit-ups, which use your own body weight as resistance. · Lift weights or dumbbells or use stretch bands at home or in a gym or community center. Stretch your muscles often. Stretching will help you as you become more active. It can help you stay flexible, loosen tight muscles, and avoid injury. It can also help improve your balance and posture and can be a great way to relax. Be sure to stretch the muscles you'll be using when you work out. It's best to warm your muscles slightly before you stretch them. Walk or do some other light aerobic activity for a few minutes, and then start stretching. When you stretch your muscles:  · Do it slowly. Stretching is not about going fast or making sudden movements. · Don't push or bounce during a stretch. · Hold each stretch for at least 15 to 30 seconds, if you can. You should feel a stretch in the muscle, but not pain. · Breathe out as you do the stretch. Then breathe in as you hold the stretch. Don't hold your breath. If you're worried about how more activity might affect your health, have a checkup before you start. Follow any special advice your doctor gives you for getting a smart start. Where can you learn more? Go to http://www.gray.com/  Enter [id number] in the search box to learn more about \"Learning About Being Physically Active. \"  Current as of: September 10, 2020               Content Version: 12.8  © 1213-6154 [employer]. Care instructions adapted under license by [employer] (which disclaims liability or warranty for this information).  If you have questions about a medical condition or this instruction, always ask your healthcare professional. [employer] disclaims any warranty or liability for your use of this information.

## 2021-07-26 LAB — VIT B1 BLD-SCNC: 140.9 NMOL/L (ref 66.5–200)

## 2021-08-23 DIAGNOSIS — E88.81 METABOLIC SYNDROME: ICD-10-CM

## 2021-08-23 RX ORDER — BLOOD SUGAR DIAGNOSTIC
STRIP MISCELLANEOUS
Qty: 100 PEN NEEDLE | Refills: 0 | Status: SHIPPED | OUTPATIENT
Start: 2021-08-23

## 2021-09-20 ENCOUNTER — TELEPHONE (OUTPATIENT)
Dept: SURGERY | Age: 66
End: 2021-09-20

## 2021-09-20 NOTE — TELEPHONE ENCOUNTER
Pt called she stated that her insurance changed and she can not get her last two fills of Victoza from [place]. Pt stated that she needs a new Rx sent to [facility]. I told her that I will forward this to Aysha Hayden.

## 2021-09-21 DIAGNOSIS — E88.81 METABOLIC SYNDROME: ICD-10-CM

## 2021-11-18 ENCOUNTER — OFFICE VISIT (OUTPATIENT)
Dept: SURGERY | Age: 66
End: 2021-11-18
Payer: MEDICARE

## 2021-11-18 VITALS
DIASTOLIC BLOOD PRESSURE: 65 MMHG | SYSTOLIC BLOOD PRESSURE: 125 MMHG | HEART RATE: 76 BPM | HEIGHT: 66 IN | WEIGHT: 203.25 LBS | BODY MASS INDEX: 32.66 KG/M2 | TEMPERATURE: 98.2 F | OXYGEN SATURATION: 100 %

## 2021-11-18 DIAGNOSIS — E66.09 CLASS 1 OBESITY DUE TO EXCESS CALORIES WITH SERIOUS COMORBIDITY AND BODY MASS INDEX (BMI) OF 33.0 TO 33.9 IN ADULT: ICD-10-CM

## 2021-11-18 DIAGNOSIS — K90.9 INTESTINAL MALABSORPTION, UNSPECIFIED TYPE: Primary | ICD-10-CM

## 2021-11-18 DIAGNOSIS — Z98.84 S/P BARIATRIC SURGERY: ICD-10-CM

## 2021-11-18 DIAGNOSIS — I10 PRIMARY HYPERTENSION: ICD-10-CM

## 2021-11-18 PROCEDURE — G0463 HOSPITAL OUTPT CLINIC VISIT: HCPCS | Performed by: NURSE PRACTITIONER

## 2021-11-18 PROCEDURE — 1090F PRES/ABSN URINE INCON ASSESS: CPT | Performed by: NURSE PRACTITIONER

## 2021-11-18 PROCEDURE — G8754 DIAS BP LESS 90: HCPCS | Performed by: NURSE PRACTITIONER

## 2021-11-18 PROCEDURE — G8427 DOCREV CUR MEDS BY ELIG CLIN: HCPCS | Performed by: NURSE PRACTITIONER

## 2021-11-18 PROCEDURE — 99214 OFFICE O/P EST MOD 30 MIN: CPT | Performed by: NURSE PRACTITIONER

## 2021-11-18 PROCEDURE — G8432 DEP SCR NOT DOC, RNG: HCPCS | Performed by: NURSE PRACTITIONER

## 2021-11-18 PROCEDURE — G8536 NO DOC ELDER MAL SCRN: HCPCS | Performed by: NURSE PRACTITIONER

## 2021-11-18 PROCEDURE — 3017F COLORECTAL CA SCREEN DOC REV: CPT | Performed by: NURSE PRACTITIONER

## 2021-11-18 PROCEDURE — 1101F PT FALLS ASSESS-DOCD LE1/YR: CPT | Performed by: NURSE PRACTITIONER

## 2021-11-18 PROCEDURE — G8752 SYS BP LESS 140: HCPCS | Performed by: NURSE PRACTITIONER

## 2021-11-18 PROCEDURE — G8400 PT W/DXA NO RESULTS DOC: HCPCS | Performed by: NURSE PRACTITIONER

## 2021-11-18 PROCEDURE — G8417 CALC BMI ABV UP PARAM F/U: HCPCS | Performed by: NURSE PRACTITIONER

## 2021-11-18 NOTE — PROGRESS NOTES
Subjective:   Missy Mahajan  is a 73 y.o. female who presents for follow-up about 14 years following laparoscopic gastric bypass surgery. Surgery related complication: NA. She has lost a total of 57 pounds since surgery. Body mass index is 33.31 kg/m². Colton Driver Loss of EBW is 45%. Has been using liragluatide for medical weight loss, but off since August due to insurance coverage issues. Her gastric pouch too small for revision. Thinks with new insurance at start of year she will be able to resume GLP-1    Starting weight: 222 lbs on 1/28/21  Previous weight 197 lbs  Current weight: 203 lbs  % wt loss: 11% while taking GLP-1  Weight loss goal: 170 lbs      The patient presents today to assess their progress toward their weight loss goal & to address any issues that may be present:   She is tolerating solid foods without difficulty, reports no real issues and denies vomiting, abdominal pain, diarrhea and reflux. Fluid intake:  good                              Protein intake:  fair - no supplements, eggs, oliver, hamburger mariano, chicken  Eating regularly, usually 2 meals a day. The patient is taking recommended vitamins. The patient's exercise level: trying to walk, not as active lately    Changes in her medical history and medications have been reviewed.     Patient Active Problem List   Diagnosis Code    HTN (hypertension) I10    Asthma J45.909    S/P bariatric surgery Z98.84    Intestinal malabsorption K90.9    Obese E66.9    Severe obesity (BMI 35.0-35.9 with comorbidity) ([facility]) E66.01, Z68.35    DJD (degenerative joint disease) M19.90    Hypercholesteremia X73.27    Metabolic syndrome B98.44     Past Medical History:   Diagnosis Date    Asthma 2/18/2011    DJD (degenerative joint disease) 2/18/2011    HTN (hypertension) 2/18/2011    Hypercholesteremia 2/18/2011    Intestinal malabsorption     expected with gastric bypass    Obese     Obesity, Class II, BMI 35-39.9, with comorbidity     Prediabetes     HgA1c 6.0%    Status post gastric bypass for obesity     2007 / judy     Past Surgical History:   Procedure Laterality Date    HX BLADDER SUSPENSION      HX GYN      removal remaining ovary after hysterectomy    HX HYSTERECTOMY      HX ORTHOPAEDIC      achilles tendon repair    PA LAP GASTRIC BYPASS/LANA-EN-Y  1/2/07    Liver bx, extensive lysis of adhesions     Current Outpatient Medications   Medication Sig Dispense Refill    Insulin Needles, Disposable, 32 gauge x 1/4\" ndle 1 SC daily 100 Pen Needle 0    cyanocobalamin (VITAMIN B-12) 1,000 mcg sublingual tablet Take 1,000 mcg by mouth daily.  nystatin (MYCOSTATIN) topical cream       gabapentin (NEURONTIN) 300 mg capsule       magnesium oxide (MAG-OX) 400 mg tablet       coenzyme q10 (Co Q-10) 10 mg cap Take  by mouth.  Premarin 0.625 mg/gram vaginal cream       clobetasoL (TEMOVATE) 0.05 % ointment       cetirizine (ZYRTEC) 10 mg tablet       amLODIPine (NORVASC) 10 mg tablet Take  by mouth daily.  multivitamin (ONE A DAY) tablet Take 1 Tab by mouth daily.  lisinopril (PRINIVIL, ZESTRIL) 20 mg tablet Take  by mouth daily.  cholecalciferol (VITAMIN D3) 1,000 unit tablet Take  by mouth daily.  montelukast (SINGULAIR) 10 mg tablet Take 10 mg by mouth daily.  fexofenadine (ALLEGRA) 180 mg tablet Take  by mouth.  omega-3 fatty acids-vitamin e (FISH OIL) 1,000 mg cap Take 1 Cap by mouth.  b complex vitamins (B COMPLEX 1) tablet Take 1 Tab by mouth daily.  CALCIUM CITRATE/VITAMIN D3 (CALCIUM CITRATE + D PO) Take  by mouth.  liraglutide (VICTOZA) 0.6 mg/0.1 mL (18 mg/3 mL) pnij 1.8 mg by SubCUTAneous route daily.  Indications: prediabetes (Patient not taking: Reported on 11/18/2021) 9 Adjustable Dose Pre-filled Pen Syringe 0       Review of Systems:  General - Denies fatigue, fever, chills  Cardiac - Denies chest pain, palpitations, shortness of breath  Pulmonary - Denies shortness of breath, productive cough  GI - as noted above  Musculoskeletal - Denies joint or muscular weakness, pain, stiffness  Hematologic - Denies abnormal bleeding, bruising  Neurologic -  Denies weakness, paralysis, numbness, tingling    Objective:     Visit Vitals  /65 (BP 1 Location: Left arm, BP Patient Position: Sitting, BP Cuff Size: Adult long)   Pulse 76   Temp 98.2 °F (36.8 °C)   Ht 5' 5.5\" (1.664 m)   Wt 92.2 kg (203 lb 4 oz)   SpO2 100%   BMI 33.31 kg/m²        Physical Exam:      General appearance:  alert, cooperative, no distress, appears stated age   Mental status   alert, oriented to person, place, and time   Neck  supple, no significant adenopathy     Lymphatics  no palpable lymphadenopathy, no hepatosplenomegaly   Chest  clear to auscultation, no wheezes, rales or rhonchi, symmetric air entry   Heart  normal rate, regular rhythm, normal S1, S2, no murmurs, rubs, clicks or gallops    Abdomen: soft, nontender, nondistended, no masses or organomegaly   Incision:  Well healed, no hernias      Neurological  alert, oriented, normal speech, no focal findings or movement disorder noted   Musculoskeletal no joint tenderness, deformity or swelling   Extremities peripheral pulses normal, no pedal edema, no clubbing or cyanosis   Skin normal coloration and turgor, no rashes, no suspicious skin lesions noted          Labs:     No results found for this or any previous visit (from the past 2016 hour(s)). Reviewed all labs from 7/22/21:  Vitamin D 38.8  B1 140.9  Iron 63  Ferritin 17  Hgb 13.4  B12 964  LFTs WNL    Assessment and Plan:   1. Intestinal malabsorption  a. continue required Vitamins: B12, B complex, D, iron, calcium, multivitamin  2. S/p laparoscopic bariatric surgery, GASTRIC BYPASS, history of morbid obesity. Reviewed weight loss progress and do recommend resuming GLP-1 once we have better since of insurance coverage. She will call me and let me know.  If possible, would like to transition her to [facility]. In the meantime, recommend using food tracking teresa to ensure adequate protein and caloric intake. Aim for 80-90 g protein daily and 800-1000 calories. Keep daily carbs below 50g. Needs to increase exercise. a. Sleep goal is 7-9 hours each night. Patient education given on the effects of sleep deprivation on weight control. b. Discussed patients weight loss goals and dietary choices in relation to goals. c. Reminded to measure portions, continue high protein, low carbohydrate diet. Reminded to eat regularly, to eat slowly & not to drink with meals. d. Continue cardio exercise and add resistance exercises. 60-90 minutes of aerobic activity 5 days a week and strength training 2 days each week. e. Encouraged to attend support group   f. Required fluid intake is >64oz daily of decaffeinated sugar free beverages. Follow up in 3 months or sooner if patient has questions, concerns or worsening of condition, if unable to reach our office, patient should report to the ED. Ms. Delmis Coleman has a reminder for a \"due or due soon\" health maintenance. I have asked that she contact her primary care provider for a follow-up on this health maintenance.      Total time spent with patient was 30 minutes

## 2021-11-18 NOTE — PATIENT INSTRUCTIONS
Patient Instructions      1. Remember hydration goals - minimum of 64 ounces of liquids per day (dehydration is the number one reason for hospital readmission). 2. Continue to monitor carbohydrate and protein intake you need a minimum of  Grams of protein daily- remember to keep your total carbohydrates to 50 grams or less per day for best results. 3. Continue to work towards exercise goals - 60-90 minutes, 5 times a week minimum of deliberate, aerobic exercise is the ultimate goal with strength training 2 times each week. Refer to [employer] for  information. 4. Remember to take vitamins as directed. 5. Attend support group the 2nd Thursday of each month. 6.  Constipation: Milk of Magnesia is for immediate relief only. Miralax is to be used every day if constipation is a chronic problem. 7.  Diarrhea: patients will occasionally develop lactose intolerance after surgery. Check to see if your protein shake has whey in it. If it does try a protein powder or drink that does not have whey and stop all yogurts, cheeses and milks to see if the diarrhea goes away. 8.  If you have had labs drawn. We will only call you if you have abnormal results. Otherwise you can access the lab results in \"mychart\". You will only need the access code the first time you sign on. 9.  Call us at [phone] or email us through [email or web address] with questions,     concerns or worsening of condition, we have someone on call 24 hours a day. If you are unable to reach our office, you are to go to your Primary Care Physician or the Emergency Department.      NOTE TO GASTRIC BYPASS PATIENTS:  (SAME APPLIES TO GASTRIC SLEEVE PATIENTS FOR FIRST TWO MONTHS)  Remember that for the rest of your life, you are not able to take the following:  - NSAIDs (ibuprofen, goody powder, BC powder, Motrin, Advil, Mobic, Voltaren, Excedrin, etc.)  - Steroid pills or injections  - Smoke (cigarettes or recreational drugs)  - Alcohol  Use of any of the above may cause ulcers in your stomach which may perforate causing a medical emergency and surgery. Speak to our medical staff if another medical provider requires you to take steroids or NSAIDs. Supplement Resource Guide    Importance of Protein:   Maintains lean body mass, produces antibodies to fight off infections, heals wounds, minimizes hair loss, helps to give you energy, helps with satiety, and keeping you full between meals. Importance of Calcium:  Needed for healthy bones and teeth, normal blood clotting, and nervous system functioning, higher risk of osteoporosis and bone disease with non-compliance. Importance of Multivitamins: Many functions. Supply you with extra nutrients that you may be missing from food. May lead to iron deficiency anemia, weakness, fatigue, and many other symptoms with non-compliance. Importance of B Vitamins:  Important for red blood cell formation, metabolism, energy, and helps to maintain a healthy nervous system. Protein Supplement  Find one you like now. Use immediately after surgery. Look for:  35-50g protein each day from your protein supplement once you reach the progression diet. 0-3 g fat per serving  0-3 g sugar per serving    Protein drinks should be split in separate dosages. Recommend: Lifelong  1 year + Calcium Supplement:     Start taking within a month after surgery. Look for: Calcium Citrate Plus D (1500 mg per day)  Recommend: Citracal     .            Avoid chocolate chewable calcium. Can use chewable bariatric or [employer] brand or similar chewable. The body cannot absorb more than 500-600 mg of calcium at a time. Take for Life Multi-vitamin Supplement:      Start immediately after surgery: any complete chewable, such as: [place]s Complete chewables. Avoid [place] sours or gummies.   They lack iron and other important nutrients and also have added sugar. Continue with chewable vitamin or change to adult complete multivitamin one month after surgery. Menstruating women can take a prenatal vitamin. Make sure has at least 18 mg iron and 060-882 mcg folic acid   Vitamin Z34, B Complex Vitamin, and Biotin  Start taking within a month after surgery. Vitamin B12:  1000 mcg of Vitamin B12 three times weekly    Must take sublingually (meaning you take it under your tongue) or in a liquid drop form for easy absorption. B Complex Vitamin: Take a pill or liquid drop form once daily. Biotin: This vitamin can help prevent hair loss. Recommend 5mg   (5000 mcg) a day  Biotin is Optional              Learning About Being Physically Active  What is physical activity? Being physically active means doing any kind of activity that gets your body moving. The types of physical activity that can help you get fit and stay healthy include:  · Aerobic or \"cardio\" activities. These make your heart beat faster and make you breathe harder, such as brisk walking, riding a bike, or running. They strengthen your heart and lungs and build up your endurance. · Strength training activities. These make your muscles work against, or \"resist,\" something. Examples include lifting weights or doing push-ups. These activities help tone and strengthen your muscles and bones. · Stretches. These let you move your joints and muscles through their full range of motion. Stretching helps you be more flexible. What are the benefits of being active? Being active is one of the best things you can do for your health. It helps you to:  · Feel stronger and have more energy to do all the things you like to do. · Focus better at school or work. · Feel, think, and sleep better. · Reach and stay at a healthy weight. · Lose fat and build lean muscle.   · Lower your risk for serious health problems, including diabetes, heart attack, high blood pressure, and some cancers. · Keep your heart, lungs, bones, muscles, and joints strong and healthy. How can you make being active part of your life? Start slowly. Make it your long-term goal to get at least 30 minutes of exercise on most days of the week. Walking is a good choice. You also may want to do other activities, such as running, swimming, cycling, or playing tennis or team sports. Pick activities that you like--ones that make your heart beat faster, your muscles stronger, and your muscles and joints more flexible. If you find more than one thing you like doing, do them all. You don't have to do the same thing every day. Get your heart pumping every day. Any activity that makes your heart beat faster and keeps it at that rate for a while counts. Here are some great ways to get your heart beating faster:  · Go for a brisk walk, run, or bike ride. · Go for a hike or swim. · Go in-line skating. · Play a game of touch football, basketball, or soccer. · Ride a bike. · Play tennis or racquetball. · Climb stairs. Even some household chores can be aerobic--just do them at a faster pace. Vacuuming, raking or mowing the lawn, sweeping the garage, and washing and waxing the car all can help get your heart rate up. Strengthen your muscles during the week. You don't have to lift heavy weights or grow big, bulky muscles to get stronger. Doing a few simple activities that make your muscles work against, or \"resist,\" something can help you get stronger. For example, you can:  · Do push-ups or sit-ups, which use your own body weight as resistance. · Lift weights or dumbbells or use stretch bands at home or in a gym or community center. Stretch your muscles often. Stretching will help you as you become more active. It can help you stay flexible, loosen tight muscles, and avoid injury. It can also help improve your balance and posture and can be a great way to relax.   Be sure to stretch the muscles you'll be using when you work out. It's best to warm your muscles slightly before you stretch them. Walk or do some other light aerobic activity for a few minutes, and then start stretching. When you stretch your muscles:  · Do it slowly. Stretching is not about going fast or making sudden movements. · Don't push or bounce during a stretch. · Hold each stretch for at least 15 to 30 seconds, if you can. You should feel a stretch in the muscle, but not pain. · Breathe out as you do the stretch. Then breathe in as you hold the stretch. Don't hold your breath. If you're worried about how more activity might affect your health, have a checkup before you start. Follow any special advice your doctor gives you for getting a smart start. Where can you learn more? Go to http://www.gray.com/  Enter [id number] in the search box to learn more about \"Learning About Being Physically Active. \"  Current as of: May 12, 2021               Content Version: 13.0  © 2006-2021 [employer]. Care instructions adapted under license by [employer] (which disclaims liability or warranty for this information). If you have questions about a medical condition or this instruction, always ask your healthcare professional. [address] any warranty or liability for your use of this information.

## 2022-01-11 RX ORDER — SEMAGLUTIDE 0.25 MG/.5ML
0.25 INJECTION, SOLUTION SUBCUTANEOUS
Qty: 1 EACH | Refills: 0 | Status: SHIPPED | OUTPATIENT
Start: 2022-01-11 | End: 2022-02-02

## 2022-03-19 PROBLEM — E88.81 METABOLIC SYNDROME: Status: ACTIVE | Noted: 2021-02-01

## 2022-03-19 PROBLEM — E88.810 METABOLIC SYNDROME: Status: ACTIVE | Noted: 2021-02-01

## 2022-03-28 ENCOUNTER — TELEPHONE (OUTPATIENT)
Dept: SURGERY | Age: 67
End: 2022-03-28

## 2022-03-29 ENCOUNTER — TELEPHONE (OUTPATIENT)
Dept: SURGERY | Age: 67
End: 2022-03-29

## 2022-03-29 NOTE — TELEPHONE ENCOUNTER
Patient aware and expresses understanding.  ----- Message from Maury Monson NP sent at 3/28/2022 11:28 AM EDT -----  Regarding: RE: refill Eugenia Leon can send a 3 month supply  ----- Message -----  From: Franco Ram LPN  Sent: 4/61/0128  10:46 AM EDT  To: Maury Monson NP  Subject: refill Victozia                                  Patient calls and would like refill of Victozia and needles to her [facility] and [facility]. Pt transferred to  to make f/u appt which was due 2/22.  Please advise

## 2022-04-19 ENCOUNTER — OFFICE VISIT (OUTPATIENT)
Dept: SURGERY | Age: 67
End: 2022-04-19
Payer: MEDICARE

## 2022-04-19 VITALS
OXYGEN SATURATION: 100 % | SYSTOLIC BLOOD PRESSURE: 129 MMHG | WEIGHT: 206.4 LBS | HEIGHT: 66 IN | TEMPERATURE: 96.9 F | BODY MASS INDEX: 33.17 KG/M2 | DIASTOLIC BLOOD PRESSURE: 64 MMHG | HEART RATE: 80 BPM

## 2022-04-19 DIAGNOSIS — K90.9 INTESTINAL MALABSORPTION, UNSPECIFIED TYPE: Primary | ICD-10-CM

## 2022-04-19 DIAGNOSIS — E55.9 HYPOVITAMINOSIS D: ICD-10-CM

## 2022-04-19 DIAGNOSIS — E66.09 CLASS 1 OBESITY DUE TO EXCESS CALORIES WITH SERIOUS COMORBIDITY AND BODY MASS INDEX (BMI) OF 33.0 TO 33.9 IN ADULT: ICD-10-CM

## 2022-04-19 DIAGNOSIS — Z98.84 S/P BARIATRIC SURGERY: ICD-10-CM

## 2022-04-19 PROCEDURE — G0463 HOSPITAL OUTPT CLINIC VISIT: HCPCS | Performed by: NURSE PRACTITIONER

## 2022-04-19 PROCEDURE — 1090F PRES/ABSN URINE INCON ASSESS: CPT | Performed by: NURSE PRACTITIONER

## 2022-04-19 PROCEDURE — G8417 CALC BMI ABV UP PARAM F/U: HCPCS | Performed by: NURSE PRACTITIONER

## 2022-04-19 PROCEDURE — G8752 SYS BP LESS 140: HCPCS | Performed by: NURSE PRACTITIONER

## 2022-04-19 PROCEDURE — G8400 PT W/DXA NO RESULTS DOC: HCPCS | Performed by: NURSE PRACTITIONER

## 2022-04-19 PROCEDURE — 1101F PT FALLS ASSESS-DOCD LE1/YR: CPT | Performed by: NURSE PRACTITIONER

## 2022-04-19 PROCEDURE — G8536 NO DOC ELDER MAL SCRN: HCPCS | Performed by: NURSE PRACTITIONER

## 2022-04-19 PROCEDURE — 3017F COLORECTAL CA SCREEN DOC REV: CPT | Performed by: NURSE PRACTITIONER

## 2022-04-19 PROCEDURE — G8754 DIAS BP LESS 90: HCPCS | Performed by: NURSE PRACTITIONER

## 2022-04-19 PROCEDURE — G8432 DEP SCR NOT DOC, RNG: HCPCS | Performed by: NURSE PRACTITIONER

## 2022-04-19 PROCEDURE — 99214 OFFICE O/P EST MOD 30 MIN: CPT | Performed by: NURSE PRACTITIONER

## 2022-04-19 PROCEDURE — G8427 DOCREV CUR MEDS BY ELIG CLIN: HCPCS | Performed by: NURSE PRACTITIONER

## 2022-04-19 RX ORDER — ESOMEPRAZOLE MAGNESIUM 40 MG/1
CAPSULE, DELAYED RELEASE ORAL
COMMUNITY
Start: 2022-01-27

## 2022-04-19 RX ORDER — MECLIZINE HYDROCHLORIDE 25 MG/1
25 TABLET ORAL
COMMUNITY

## 2022-04-19 NOTE — PATIENT INSTRUCTIONS
Patient Instructions      1. Remember hydration goals - minimum of 64 ounces of liquids per day (dehydration is the number one reason for hospital readmission). 2. Continue to monitor carbohydrate and protein intake you need a minimum of  Grams of protein daily- remember to keep your total carbohydrates to 50 grams or less per day for best results. 3. Continue to work towards exercise goals - 60-90 minutes, 5 times a week minimum of deliberate, aerobic exercise is the ultimate goal with strength training 2 times each week. Refer to [employer] for  information. 4. Remember to take vitamins as directed. 5. Attend support group the 2nd Thursday of each month. 6.  Constipation: Milk of Magnesia is for immediate relief only. Miralax is to be used every day if constipation is a chronic problem. 7.  Diarrhea: patients will occasionally develop lactose intolerance after surgery. Check to see if your protein shake has whey in it. If it does try a protein powder or drink that does not have whey and stop all yogurts, cheeses and milks to see if the diarrhea goes away. 8.  If you have had labs drawn. We will only call you if you have abnormal results. Otherwise you can access the lab results in \"mychart\". You will only need the access code the first time you sign on. 9.  Call us at [phone] or email us through [email or web address] with questions,     concerns or worsening of condition, we have someone on call 24 hours a day. If you are unable to reach our office, you are to go to your Primary Care Physician or the Emergency Department.      NOTE TO GASTRIC BYPASS PATIENTS:  (SAME APPLIES TO GASTRIC SLEEVE PATIENTS FOR FIRST TWO MONTHS)  Remember that for the rest of your life, you are not able to take the following:  - NSAIDs (ibuprofen, goody powder, BC powder, Motrin, Advil, Mobic, Voltaren, Excedrin, etc.)  - Steroid pills or injections  - Smoke (cigarettes or recreational drugs)  - Alcohol  Use of any of the above may cause ulcers in your stomach which may perforate causing a medical emergency and surgery. Speak to our medical staff if another medical provider requires you to take steroids or NSAIDs. Supplement Resource Guide    Importance of Protein:   Maintains lean body mass, produces antibodies to fight off infections, heals wounds, minimizes hair loss, helps to give you energy, helps with satiety, and keeping you full between meals. Importance of Calcium:  Needed for healthy bones and teeth, normal blood clotting, and nervous system functioning, higher risk of osteoporosis and bone disease with non-compliance. Importance of Multivitamins: Many functions. Supply you with extra nutrients that you may be missing from food. May lead to iron deficiency anemia, weakness, fatigue, and many other symptoms with non-compliance. Importance of B Vitamins:  Important for red blood cell formation, metabolism, energy, and helps to maintain a healthy nervous system. Protein Supplement  Find one you like now. Use immediately after surgery. Look for:  35-50g protein each day from your protein supplement once you reach the progression diet. 0-3 g fat per serving  0-3 g sugar per serving    Protein drinks should be split in separate dosages. Recommend: Lifelong  1 year + Calcium Supplement:     Start taking within a month after surgery. Look for: Calcium Citrate Plus D (1500 mg per day)  Recommend: Citracal     .            Avoid chocolate chewable calcium. Can use chewable bariatric or [employer] brand or similar chewable. The body cannot absorb more than 500-600 mg of calcium at a time. Take for Life Multi-vitamin Supplement:      Start immediately after surgery: any complete chewable, such as: [place]s Complete chewables. Avoid [place] sours or gummies.   They lack iron and other important nutrients and also have added sugar. Continue with chewable vitamin or change to adult complete multivitamin one month after surgery. Menstruating women can take a prenatal vitamin. Make sure has at least 18 mg iron and 519-199 mcg folic acid   Vitamin K60, B Complex Vitamin, and Biotin  Start taking within a month after surgery. Vitamin B12:  1000 mcg of Vitamin B12 three times weekly    Must take sublingually (meaning you take it under your tongue) or in a liquid drop form for easy absorption. B Complex Vitamin: Take a pill or liquid drop form once daily. Biotin: This vitamin can help prevent hair loss. Recommend 5mg   (5000 mcg) a day  Biotin is Optional              Learning About Being Physically Active  What is physical activity? Being physically active means doing any kind of activity that gets your body moving. The types of physical activity that can help you get fit and stay healthy include:  · Aerobic or \"cardio\" activities. These make your heart beat faster and make you breathe harder, such as brisk walking, riding a bike, or running. They strengthen your heart and lungs and build up your endurance. · Strength training activities. These make your muscles work against, or \"resist,\" something. Examples include lifting weights or doing push-ups. These activities help tone and strengthen your muscles and bones. · Stretches. These let you move your joints and muscles through their full range of motion. Stretching helps you be more flexible. What are the benefits of being active? Being active is one of the best things you can do for your health. It helps you to:  · Feel stronger and have more energy to do all the things you like to do. · Focus better at school or work. · Feel, think, and sleep better. · Reach and stay at a healthy weight. · Lose fat and build lean muscle.   · Lower your risk for serious health problems, including diabetes, heart attack, high blood pressure, and some cancers. · Keep your heart, lungs, bones, muscles, and joints strong and healthy. How can you make being active part of your life? Start slowly. Make it your long-term goal to get at least 30 minutes of exercise on most days of the week. Walking is a good choice. You also may want to do other activities, such as running, swimming, cycling, or playing tennis or team sports. Pick activities that you likeones that make your heart beat faster, your muscles stronger, and your muscles and joints more flexible. If you find more than one thing you like doing, do them all. You don't have to do the same thing every day. Get your heart pumping every day. Any activity that makes your heart beat faster and keeps it at that rate for a while counts. Here are some great ways to get your heart beating faster:  · Go for a brisk walk, run, or bike ride. · Go for a hike or swim. · Go in-line skating. · Play a game of touch football, basketball, or soccer. · Ride a bike. · Play tennis or racquetball. · Climb stairs. Even some household chores can be aerobicjust do them at a faster pace. Vacuuming, raking or mowing the lawn, sweeping the garage, and washing and waxing the car all can help get your heart rate up. Strengthen your muscles during the week. You don't have to lift heavy weights or grow big, bulky muscles to get stronger. Doing a few simple activities that make your muscles work against, or \"resist,\" something can help you get stronger. For example, you can:  · Do push-ups or sit-ups, which use your own body weight as resistance. · Lift weights or dumbbells or use stretch bands at home or in a gym or community center. Stretch your muscles often. Stretching will help you as you become more active. It can help you stay flexible, loosen tight muscles, and avoid injury. It can also help improve your balance and posture and can be a great way to relax.   Be sure to stretch the muscles you'll be using when you work out. It's best to warm your muscles slightly before you stretch them. Walk or do some other light aerobic activity for a few minutes, and then start stretching. When you stretch your muscles:  · Do it slowly. Stretching is not about going fast or making sudden movements. · Don't push or bounce during a stretch. · Hold each stretch for at least 15 to 30 seconds, if you can. You should feel a stretch in the muscle, but not pain. · Breathe out as you do the stretch. Then breathe in as you hold the stretch. Don't hold your breath. If you're worried about how more activity might affect your health, have a checkup before you start. Follow any special advice your doctor gives you for getting a smart start. Where can you learn more? Go to http://www.gray.com/  Enter [id number] in the search box to learn more about \"Learning About Being Physically Active. \"  Current as of: May 12, 2021               Content Version: 13.2  © 2006-2022 [employer]. Care instructions adapted under license by [employer] (which disclaims liability or warranty for this information). If you have questions about a medical condition or this instruction, always ask your healthcare professional. [address] any warranty or liability for your use of this information.

## 2022-04-19 NOTE — PROGRESS NOTES
Subjective:   Denia Noel  is a 73 y.o. female who presents for follow-up about 15 years following laparoscopic gastric bypass surgery. Surgery related complication: NA. She has lost a total of 54 pounds since surgery. Body mass index is 33.82 kg/m². Frandy Obed Loss of EBW is 43%. Has been on liraglutide for medical weight loss since Jan 2021 after found not to be a candidate for revision or band over bypass. Starting weight: 222 lbs on 1/28/21  Previous weight 203 lbs  Current weight: 206 lbs  % wt loss: 7% while taking GLP-1  Weight loss goal: 170 lbs    The patient presents today to assess their progress toward their weight loss goal & to address any issues that may be present:   She is tolerating solid foods without difficulty, reports some dumping syndrome over last few weeks with eating jelly beans and denies vomiting, abdominal pain, difficulty swallowing and nausea. Fluid intake:  good                              Protein intake:  fair eggs, oliver, sausage, chicken  Eating regularly. Some grits, had candy, rare fries  The patient is taking recommended vitamins. The patient's exercise level: now structured exercise, just bought treadmill    Changes in her medical history and medications have been reviewed.   Recent labs with PCP reviewed    Comorbidities:    Hypertension: improved, on Rx  Diabetes: resolved, HgA1c 5.6  Obstructive Sleep Apnea: not applicable  Hyperlipidemia: improved  Stress Urinary Incontinence: not applicable  Gastroesophageal Reflux: improved, on PPI  Weight related arthropathy:improved    Patient Active Problem List   Diagnosis Code    HTN (hypertension) I10    Asthma J45.909    S/P bariatric surgery Z98.84    Intestinal malabsorption K90.9    Class 1 obesity due to excess calories with serious comorbidity in adult E66.09    Severe obesity (BMI 35.0-35.9 with comorbidity) (HCC) E66.01, Z68.35    DJD (degenerative joint disease) M19.90    Hypercholesteremia E78.00    Metabolic syndrome J84.82     Past Medical History:   Diagnosis Date    Asthma 2/18/2011    DJD (degenerative joint disease) 2/18/2011    HTN (hypertension) 2/18/2011    Hypercholesteremia 2/18/2011    Intestinal malabsorption     expected with gastric bypass    Obese     Obesity, Class II, BMI 35-39.9, with comorbidity     Prediabetes     HgA1c 6.0%    Status post gastric bypass for obesity     2007 / [facility]     Past Surgical History:   Procedure Laterality Date    HX BLADDER SUSPENSION      HX GYN      removal remaining ovary after hysterectomy    HX HYSTERECTOMY      HX ORTHOPAEDIC      achilles tendon repair    AL LAP GASTRIC BYPASS/LANA-EN-Y  1/2/07    Liver bx, extensive lysis of adhesions     Current Outpatient Medications   Medication Sig Dispense Refill    esomeprazole (NEXIUM) 40 mg capsule       ascorbic acid, vitamin C, (VITAMIN C) 100 mg tab Take 100 mg by mouth daily.  meclizine (ANTIVERT) 25 mg tablet Take 25 mg by mouth three (3) times daily as needed.  Insulin Syringe-Needle U-100 1 mL 25 gauge x 5/8\" syrg 1 Syringe by Does Not Apply route daily for 90 days. 100 Each 0    liraglutide (VICTOZA) 0.6 mg/0.1 mL (18 mg/3 mL) pnij 1.8 mg by SubCUTAneous route daily for 90 days. Indications: prediabetes 9 Adjustable Dose Pre-filled Pen Syringe 0    Insulin Needles, Disposable, 32 gauge x 1/4\" ndle 1 SC daily 100 Pen Needle 0    nystatin (MYCOSTATIN) topical cream       gabapentin (NEURONTIN) 300 mg capsule       magnesium oxide (MAG-OX) 400 mg tablet       Premarin 0.625 mg/gram vaginal cream       clobetasoL (TEMOVATE) 0.05 % ointment       cetirizine (ZYRTEC) 10 mg tablet       amLODIPine (NORVASC) 10 mg tablet Take  by mouth daily.  multivitamin (ONE A DAY) tablet Take 1 Tab by mouth daily.  lisinopril (PRINIVIL, ZESTRIL) 20 mg tablet Take  by mouth daily.  cholecalciferol (VITAMIN D3) 1,000 unit tablet Take  by mouth daily.       montelukast (SINGULAIR) 10 mg tablet Take 10 mg by mouth daily.  fexofenadine (ALLEGRA) 180 mg tablet Take  by mouth.  omega-3 fatty acids-vitamin e (FISH OIL) 1,000 mg cap Take 1 Cap by mouth.  b complex vitamins (B COMPLEX 1) tablet Take 1 Tab by mouth daily.  CALCIUM CITRATE/VITAMIN D3 (CALCIUM CITRATE + D PO) Take  by mouth.            Review of Systems:  General - Denies fatigue, fever, chills  Cardiac - Denies chest pain, palpitations, shortness of breath  Pulmonary - Denies shortness of breath, productive cough  GI - as noted above  Musculoskeletal - Denies joint or muscular weakness, pain, stiffness  Hematologic - Denies abnormal bleeding, bruising  Neurologic -  Denies weakness, paralysis, numbness, tingling    Objective:     Visit Vitals  /64 (BP 1 Location: Right upper arm, BP Patient Position: Sitting, BP Cuff Size: Large adult)   Pulse 80   Temp 96.9 °F (36.1 °C)   Ht 5' 5.5\" (1.664 m)   Wt 93.6 kg (206 lb 6.4 oz)   SpO2 100%   BMI 33.82 kg/m²        Physical Exam:      General appearance:  alert, cooperative, no distress, appears stated age   Mental status   alert, oriented to person, place, and time   Neck  supple, no significant adenopathy     Lymphatics  no palpable lymphadenopathy, no hepatosplenomegaly   Chest  clear to auscultation, no wheezes, rales or rhonchi, symmetric air entry   Heart  normal rate, regular rhythm, normal S1, S2, no murmurs, rubs, clicks or gallops    Abdomen: soft, nontender, nondistended, no masses or organomegaly   Incision:  Well healed, no hernias      Neurological  alert, oriented, normal speech, no focal findings or movement disorder noted   Musculoskeletal no joint tenderness, deformity or swelling   Extremities peripheral pulses normal, no pedal edema, no clubbing or cyanosis   Skin normal coloration and turgor, no rashes, no suspicious skin lesions noted          Labs:     No results found for this or any previous visit (from the past 2016 hour(s)). Lab Results   Component Value Date/Time    WBC 5.9 07/22/2021 01:40 PM    HGB 13.4 07/22/2021 01:40 PM    HCT 41.7 07/22/2021 01:40 PM    PLATELET 002 (H) 03/09/8288 01:40 PM    MCV 89.7 07/22/2021 01:40 PM     Lab Results   Component Value Date/Time    Sodium 142 07/22/2021 01:40 PM    Potassium 3.9 07/22/2021 01:40 PM    Chloride 111 07/22/2021 01:40 PM    CO2 30 07/22/2021 01:40 PM    Anion gap 1 (L) 07/22/2021 01:40 PM    Glucose 77 07/22/2021 01:40 PM    BUN 13 07/22/2021 01:40 PM    Creatinine 0.69 07/22/2021 01:40 PM    BUN/Creatinine ratio 19 07/22/2021 01:40 PM    GFR est AA >60 07/22/2021 01:40 PM    GFR est non-AA >60 07/22/2021 01:40 PM    Calcium 9.5 07/22/2021 01:40 PM    Bilirubin, total 0.4 07/22/2021 01:40 PM    Alk. phosphatase 93 07/22/2021 01:40 PM    Protein, total 7.4 07/22/2021 01:40 PM    Albumin 4.4 07/22/2021 01:40 PM    Globulin 3.0 07/22/2021 01:40 PM    A-G Ratio 1.5 07/22/2021 01:40 PM    ALT (SGPT) 53 07/22/2021 01:40 PM     Lab Results   Component Value Date/Time    Iron 63 07/22/2021 01:40 PM    Ferritin 17 07/22/2021 01:40 PM     Lab Results   Component Value Date/Time    Folate >20.0 (H) 07/22/2021 01:40 PM     Lab Results   Component Value Date/Time    Vitamin D 25-Hydroxy 38.8 07/22/2021 01:41 PM       B1 140.9  7/22/21    Reviewed [facility] from 3/17/22:  LFTs WNL  Crt 0.8  K 4.2  Hgb 13.6  Hct 41.8    Assessment and Plan:   1. Intestinal malabsorption  a. continue required Vitamins: B12, B complex, D, iron, calcium, multivitamin  2. S/p laparoscopic bariatric surgery, GASTRIC BYPASS, history of morbid obesity. Continue focusing on adequate protein intake and increasing exercise. Will continue GLP-1, keep carbs below 50g daily. a. Sleep goal is 7-9 hours each night. Patient education given on the effects of sleep deprivation on weight control. b. Discussed patients weight loss goals and dietary choices in relation to goals.   c. Reminded to measure portions, continue high protein, low carbohydrate diet. Reminded to eat regularly, to eat slowly & not to drink with meals. d. Continue cardio exercise and add resistance exercises. 60-90 minutes of aerobic activity 5 days a week and strength training 2 days each week. e. Encouraged to attend support group   f. Required fluid intake is >64oz daily of decaffeinated sugar free beverages. Labs ordered today  Follow up in 3 months or sooner if patient has questions, concerns or worsening of condition, if unable to reach our office, patient should report to the ED. Ms. Heather Tirado has a reminder for a \"due or due soon\" health maintenance. I have asked that she contact her primary care provider for a follow-up on this health maintenance.      Total time spent with patient was 30 minutes

## 2022-07-22 ENCOUNTER — OFFICE VISIT (OUTPATIENT)
Dept: SURGERY | Age: 67
End: 2022-07-22
Payer: MEDICARE

## 2022-07-22 ENCOUNTER — HOSPITAL ENCOUNTER (OUTPATIENT)
Dept: LAB | Age: 67
Discharge: HOME | End: 2022-07-22
Payer: MEDICARE

## 2022-07-22 VITALS
HEART RATE: 78 BPM | BODY MASS INDEX: 33.65 KG/M2 | SYSTOLIC BLOOD PRESSURE: 133 MMHG | HEIGHT: 66 IN | WEIGHT: 209.4 LBS | DIASTOLIC BLOOD PRESSURE: 61 MMHG | OXYGEN SATURATION: 100 % | TEMPERATURE: 97.3 F

## 2022-07-22 DIAGNOSIS — Z98.84 S/P BARIATRIC SURGERY: ICD-10-CM

## 2022-07-22 DIAGNOSIS — R19.7 DIARRHEA, UNSPECIFIED TYPE: ICD-10-CM

## 2022-07-22 DIAGNOSIS — K90.9 INTESTINAL MALABSORPTION, UNSPECIFIED TYPE: Primary | ICD-10-CM

## 2022-07-22 DIAGNOSIS — Z71.3 ENCOUNTER FOR DIETARY COUNSELING AND SURVEILLANCE: ICD-10-CM

## 2022-07-22 LAB
25(OH)D3 SERPL-MCNC: 34.5 NG/ML (ref 30–100)
ALBUMIN SERPL-MCNC: 3.5 G/DL (ref 3.4–5)
ALBUMIN/GLOB SERPL: 1.3 {RATIO} (ref 0.8–1.7)
ALP SERPL-CCNC: 70 U/L (ref 45–117)
ALT SERPL-CCNC: 49 U/L (ref 13–56)
ANION GAP SERPL CALC-SCNC: 4 MMOL/L (ref 3–18)
AST SERPL-CCNC: 37 U/L (ref 10–38)
BASOPHILS # BLD: 0 K/UL (ref 0–0.1)
BASOPHILS NFR BLD: 1 % (ref 0–2)
BILIRUB SERPL-MCNC: 0.4 MG/DL (ref 0.2–1)
BUN SERPL-MCNC: 9 MG/DL (ref 7–18)
BUN/CREAT SERPL: 12 (ref 12–20)
CALCIUM SERPL-MCNC: 9.3 MG/DL (ref 8.5–10.1)
CHLORIDE SERPL-SCNC: 112 MMOL/L (ref 100–111)
CO2 SERPL-SCNC: 31 MMOL/L (ref 21–32)
CREAT SERPL-MCNC: 0.76 MG/DL (ref 0.6–1.3)
DIFFERENTIAL METHOD BLD: ABNORMAL
EOSINOPHIL # BLD: 0.1 K/UL (ref 0–0.4)
EOSINOPHIL NFR BLD: 3 % (ref 0–5)
ERYTHROCYTE [DISTWIDTH] IN BLOOD BY AUTOMATED COUNT: 14 % (ref 11.6–14.5)
FERRITIN SERPL-MCNC: 8 NG/ML (ref 8–388)
FOLATE SERPL-MCNC: >20 NG/ML (ref 3.1–17.5)
GLOBULIN SER CALC-MCNC: 2.8 G/DL (ref 2–4)
GLUCOSE SERPL-MCNC: 87 MG/DL (ref 74–99)
HCT VFR BLD AUTO: 34 % (ref 35–45)
HGB BLD-MCNC: 10.8 G/DL (ref 12–16)
IMM GRANULOCYTES # BLD AUTO: 0 K/UL (ref 0–0.04)
IMM GRANULOCYTES NFR BLD AUTO: 0 % (ref 0–0.5)
IRON SATN MFR SERPL: 11 % (ref 20–50)
IRON SERPL-MCNC: 48 UG/DL (ref 50–175)
LYMPHOCYTES # BLD: 1.1 K/UL (ref 0.9–3.6)
LYMPHOCYTES NFR BLD: 25 % (ref 21–52)
MCH RBC QN AUTO: 28.8 PG (ref 24–34)
MCHC RBC AUTO-ENTMCNC: 31.8 G/DL (ref 31–37)
MCV RBC AUTO: 90.7 FL (ref 78–100)
MONOCYTES # BLD: 0.4 K/UL (ref 0.05–1.2)
MONOCYTES NFR BLD: 10 % (ref 3–10)
NEUTS SEG # BLD: 2.8 K/UL (ref 1.8–8)
NEUTS SEG NFR BLD: 62 % (ref 40–73)
NRBC # BLD: 0 K/UL (ref 0–0.01)
NRBC BLD-RTO: 0 PER 100 WBC
PLATELET # BLD AUTO: 495 K/UL (ref 135–420)
PMV BLD AUTO: 9.2 FL (ref 9.2–11.8)
POTASSIUM SERPL-SCNC: 4.3 MMOL/L (ref 3.5–5.5)
PROT SERPL-MCNC: 6.3 G/DL (ref 6.4–8.2)
RBC # BLD AUTO: 3.75 M/UL (ref 4.2–5.3)
SODIUM SERPL-SCNC: 147 MMOL/L (ref 136–145)
TIBC SERPL-MCNC: 426 UG/DL (ref 250–450)
VIT B12 SERPL-MCNC: >2000 PG/ML (ref 211–911)
WBC # BLD AUTO: 4.4 K/UL (ref 4.6–13.2)

## 2022-07-22 PROCEDURE — G8400 PT W/DXA NO RESULTS DOC: HCPCS | Performed by: NURSE PRACTITIONER

## 2022-07-22 PROCEDURE — 80053 COMPREHEN METABOLIC PANEL: CPT

## 2022-07-22 PROCEDURE — 1101F PT FALLS ASSESS-DOCD LE1/YR: CPT | Performed by: NURSE PRACTITIONER

## 2022-07-22 PROCEDURE — 1090F PRES/ABSN URINE INCON ASSESS: CPT | Performed by: NURSE PRACTITIONER

## 2022-07-22 PROCEDURE — G0463 HOSPITAL OUTPT CLINIC VISIT: HCPCS | Performed by: NURSE PRACTITIONER

## 2022-07-22 PROCEDURE — 85025 COMPLETE CBC W/AUTO DIFF WBC: CPT

## 2022-07-22 PROCEDURE — G8432 DEP SCR NOT DOC, RNG: HCPCS | Performed by: NURSE PRACTITIONER

## 2022-07-22 PROCEDURE — 3017F COLORECTAL CA SCREEN DOC REV: CPT | Performed by: NURSE PRACTITIONER

## 2022-07-22 PROCEDURE — 82607 VITAMIN B-12: CPT

## 2022-07-22 PROCEDURE — 82306 VITAMIN D 25 HYDROXY: CPT

## 2022-07-22 PROCEDURE — 1123F ACP DISCUSS/DSCN MKR DOCD: CPT | Performed by: NURSE PRACTITIONER

## 2022-07-22 PROCEDURE — G8427 DOCREV CUR MEDS BY ELIG CLIN: HCPCS | Performed by: NURSE PRACTITIONER

## 2022-07-22 PROCEDURE — G8536 NO DOC ELDER MAL SCRN: HCPCS | Performed by: NURSE PRACTITIONER

## 2022-07-22 PROCEDURE — G8754 DIAS BP LESS 90: HCPCS | Performed by: NURSE PRACTITIONER

## 2022-07-22 PROCEDURE — 84425 ASSAY OF VITAMIN B-1: CPT

## 2022-07-22 PROCEDURE — 99214 OFFICE O/P EST MOD 30 MIN: CPT | Performed by: NURSE PRACTITIONER

## 2022-07-22 PROCEDURE — 82728 ASSAY OF FERRITIN: CPT

## 2022-07-22 PROCEDURE — G8417 CALC BMI ABV UP PARAM F/U: HCPCS | Performed by: NURSE PRACTITIONER

## 2022-07-22 PROCEDURE — 36415 COLL VENOUS BLD VENIPUNCTURE: CPT

## 2022-07-22 PROCEDURE — 83540 ASSAY OF IRON: CPT

## 2022-07-22 NOTE — PROGRESS NOTES
Subjective:   Elizabeth Dan  is a 78 y.o. female who presents for follow-up about 15.5 years following laparoscopic gastric bypass surgery. Surgery related complication: NA. She has lost a total of 51 pounds since surgery. Body mass index is 34.32 kg/m². Lorean Snare Loss of EBW is 40%. Has been on liraglutide for medical weight loss since Jan 2021 after found not to be a candidate for revision or band over bypass. Starting weight: 222 lbs on 1/28/21  Previous weight 206 lbs  Current weight: 209 lbs  % wt loss: 6% while taking GLP-1  Weight loss goal: 170 lbs    The patient presents today to assess their progress toward their weight loss goal & to address any issues that may be present:   She is tolerating solid foods without difficulty, reports diarrhea and reflux and denies vomiting, abdominal pain, difficulty swallowing and nausea. Noticing diarrhea after eating breakfast with eggs and oliver. Seen PCP who ordered stool studies. Fluid intake:  good, 70 oz                             Protein intake:  fair, oliver, eggs  Eating regularly. The patient is taking recommended vitamins. The patient's exercise level: not active. Changes in her medical history and medications have been reviewed.       Comorbidities:    Hypertension: improved, on Rx  Diabetes: resolved, HgA1c 5.6  Obstructive Sleep Apnea: not applicable  Hyperlipidemia: improved  Stress Urinary Incontinence: not applicable  Gastroesophageal Reflux: improved, on PPI  Weight related arthropathy:improved    Patient Active Problem List   Diagnosis Code    HTN (hypertension) I10    Asthma J45.909    S/P bariatric surgery Z98.84    Intestinal malabsorption K90.9    Class 1 obesity due to excess calories with serious comorbidity in adult E66.09    Severe obesity (BMI 35.0-35.9 with comorbidity) (HCC) E66.01, Z68.35    DJD (degenerative joint disease) M19.90    Hypercholesteremia P25.09    Metabolic syndrome J36.05     Past Medical History:   Diagnosis Date    Asthma 2/18/2011    DJD (degenerative joint disease) 2/18/2011    HTN (hypertension) 2/18/2011    Hypercholesteremia 2/18/2011    Intestinal malabsorption     expected with gastric bypass    Obese     Obesity, Class II, BMI 35-39.9, with comorbidity     Prediabetes     HgA1c 6.0%    Status post gastric bypass for obesity     2007 / [facility]     Past Surgical History:   Procedure Laterality Date    HX BLADDER SUSPENSION      HX GYN      removal remaining ovary after hysterectomy    HX HYSTERECTOMY      HX ORTHOPAEDIC      achilles tendon repair    [place] LAP GASTRIC BYPASS/LANA-EN-Y  1/2/07    Liver bx, extensive lysis of adhesions     Current Outpatient Medications   Medication Sig Dispense Refill    liraglutide (VICTOZA) 0.6 mg/0.1 mL (18 mg/3 mL) pnij 1.8 mg by SubCUTAneous route daily for 90 days. Indications: prediabetes 9 Adjustable Dose Pre-filled Pen Syringe 0    esomeprazole (NEXIUM) 40 mg capsule       ascorbic acid, vitamin C, (VITAMIN C) 100 mg tab Take 100 mg by mouth daily. meclizine (ANTIVERT) 25 mg tablet Take 25 mg by mouth three (3) times daily as needed. Insulin Needles, Disposable, 32 gauge x 1/4\" ndle 1 SC daily 100 Pen Needle 0    gabapentin (NEURONTIN) 300 mg capsule       magnesium oxide (MAG-OX) 400 mg tablet       Premarin 0.625 mg/gram vaginal cream       clobetasoL (TEMOVATE) 0.05 % ointment       cetirizine (ZYRTEC) 10 mg tablet       amLODIPine (NORVASC) 10 mg tablet Take  by mouth daily. multivitamin (ONE A DAY) tablet Take 1 Tab by mouth daily. lisinopril (PRINIVIL, ZESTRIL) 20 mg tablet Take  by mouth daily. cholecalciferol (VITAMIN D3) (1000 Units /25 mcg) tablet Take  by mouth daily. montelukast (SINGULAIR) 10 mg tablet Take 10 mg by mouth daily. omega-3 fatty acids-vitamin e 1,000 mg cap Take 1 Cap by mouth.      b complex vitamins tablet Take 1 Tab by mouth daily. CALCIUM CITRATE/VITAMIN D3 (CALCIUM CITRATE + D PO) Take  by mouth. nystatin (MYCOSTATIN) topical cream  (Patient not taking: Reported on 7/22/2022)      fexofenadine (ALLEGRA) 180 mg tablet Take  by mouth. (Patient not taking: Reported on 7/22/2022)         Review of Systems:  General - Denies fatigue, fever, chills  Cardiac - Denies chest pain, palpitations, shortness of breath  Pulmonary - Denies shortness of breath, productive cough  GI - as noted above  Musculoskeletal - Denies joint or muscular weakness, pain, stiffness  Hematologic - Denies abnormal bleeding, bruising  Neurologic -  Denies weakness, paralysis, numbness, tingling    Objective:     Visit Vitals  /61 (BP 1 Location: Left lower arm, BP Patient Position: Sitting, BP Cuff Size: Large adult)   Pulse 78   Temp 97.3 °F (36.3 °C)   Ht 5' 5.5\" (1.664 m)   Wt 95 kg (209 lb 6.4 oz)   SpO2 100%   BMI 34.32 kg/m²        Physical Exam:      General appearance:  alert, cooperative, no distress, appears stated age   Mental status   alert, oriented to person, place, and time   Neck  supple, no significant adenopathy     Lymphatics  no palpable lymphadenopathy, no hepatosplenomegaly   Chest  clear to auscultation, no wheezes, rales or rhonchi, symmetric air entry   Heart  normal rate, regular rhythm, normal S1, S2, no murmurs, rubs, clicks or gallops    Abdomen: soft, nontender, nondistended, no masses or organomegaly   Incision:  Well healed, no hernias      Neurological  alert, oriented, normal speech, no focal findings or movement disorder noted   Musculoskeletal no joint tenderness, deformity or swelling   Extremities peripheral pulses normal, no pedal edema, no clubbing or cyanosis   Skin normal coloration and turgor, no rashes, no suspicious skin lesions noted          Labs:     No results found for this or any previous visit (from the past 2016 hour(s)).     Lab Results   Component Value Date/Time    WBC 5.9 07/22/2021 01:40 PM    HGB 13.4 07/22/2021 01:40 PM    HCT 41.7 07/22/2021 01:40 PM    PLATELET 082 (H) 07/22/2021 01:40 PM    MCV 89.7 07/22/2021 01:40 PM     Lab Results   Component Value Date/Time    Sodium 142 07/22/2021 01:40 PM    Potassium 3.9 07/22/2021 01:40 PM    Chloride 111 07/22/2021 01:40 PM    CO2 30 07/22/2021 01:40 PM    Anion gap 1 (L) 07/22/2021 01:40 PM    Glucose 77 07/22/2021 01:40 PM    BUN 13 07/22/2021 01:40 PM    Creatinine 0.69 07/22/2021 01:40 PM    BUN/Creatinine ratio 19 07/22/2021 01:40 PM    GFR est AA >60 07/22/2021 01:40 PM    GFR est non-AA >60 07/22/2021 01:40 PM    Calcium 9.5 07/22/2021 01:40 PM    Bilirubin, total 0.4 07/22/2021 01:40 PM    Alk. phosphatase 93 07/22/2021 01:40 PM    Protein, total 7.4 07/22/2021 01:40 PM    Albumin 4.4 07/22/2021 01:40 PM    Globulin 3.0 07/22/2021 01:40 PM    A-G Ratio 1.5 07/22/2021 01:40 PM    ALT (SGPT) 53 07/22/2021 01:40 PM     Lab Results   Component Value Date/Time    Iron 63 07/22/2021 01:40 PM    Ferritin 17 07/22/2021 01:40 PM     Lab Results   Component Value Date/Time    Folate >20.0 (H) 07/22/2021 01:40 PM     Lab Results   Component Value Date/Time    Vitamin D 25-Hydroxy 38.8 07/22/2021 01:41 PM        Assessment and Plan:   Intestinal malabsorption  continue required Vitamins: B12, B complex, D, iron, calcium, multivitamin  S/p laparoscopic bariatric surgery, GASTRIC BYPASS, history of morbid obesity  Sleep goal is 7-9 hours each night. Patient education given on the effects of sleep deprivation on weight control. Discussed patients weight loss goals and dietary choices in relation to goals. Reminded to measure portions, continue high protein, low carbohydrate diet. Reminded to eat regularly, to eat slowly & not to drink with meals. Continue cardio exercise and add resistance exercises. 60-90 minutes of aerobic activity 5 days a week and strength training 2 days each week. Encouraged to attend support group   Required fluid intake is >64oz daily of decaffeinated sugar free beverages.    3. Diarrhea - still has gallbladder, denies RUQ pain, no nausea or vomiting, non tender on exam. Decreased fats in diet, add fiber supplement if not improved, decrease liralgutide to 1.2mg may need to stop. Labs ordered today  Follow up in 3 months or sooner if patient has questions, concerns or worsening of condition, if unable to reach our office, patient should report to the ED. Ms. Ty Soto has a reminder for a \"due or due soon\" health maintenance. I have asked that she contact her primary care provider for a follow-up on this health maintenance.      Total time spent with patient was 30 minutes

## 2022-07-22 NOTE — PATIENT INSTRUCTIONS
Patient Instructions      Remember hydration goals - minimum of 64 ounces of liquids per day (dehydration is the number one reason for hospital readmission). Continue to monitor carbohydrate and protein intake you need a minimum of  Grams of protein daily- remember to keep your total carbohydrates to 50 grams or less per day for best results. Continue to work towards exercise goals - 60-90 minutes, 5 times a week minimum of deliberate, aerobic exercise is the ultimate goal with strength training 2 times each week. Refer to [facility] for  information. Remember to take vitamins as directed. Attend support group the 2nd Thursday of each month. 6.  Constipation: Milk of Magnesia is for immediate relief only. Miralax is to be used every day if constipation is a chronic problem. 7.  Diarrhea: patients will occasionally develop lactose intolerance after surgery. Check to see if your protein shake has whey in it. If it does try a protein powder or drink that does not have whey and stop all yogurts, cheeses and milks to see if the diarrhea goes away. 8.  If you have had labs drawn. We will only call you if you have abnormal results. Otherwise you can access the lab results in \"[facility]\". You will only need the access code the first time you sign on. 9.  Call us at [phone] or email us through [facility] with questions,     concerns or worsening of condition, we have someone on call 24 hours a day. If you are unable to reach our office, you are to go to your Primary Care Physician or the Emergency Department.      NOTE TO GASTRIC BYPASS PATIENTS:  (SAME APPLIES TO GASTRIC SLEEVE PATIENTS FOR FIRST TWO MONTHS)  Remember that for the rest of your life, you are not able to take the following:  - NSAIDs (ibuprofen, goody powder, BC powder, Motrin, Advil, Mobic, Voltaren, Excedrin, etc.)  - Steroid pills or injections  - Smoke (cigarettes or recreational drugs)  - Alcohol  Use of any of the above may cause ulcers in your stomach which may perforate causing a medical emergency and surgery. Speak to our medical staff if another medical provider requires you to take steroids or NSAIDs. Supplement Resource Guide    Importance of Protein:   Maintains lean body mass, produces antibodies to fight off infections, heals wounds, minimizes hair loss, helps to give you energy, helps with satiety, and keeping you full between meals. Importance of Calcium:  Needed for healthy bones and teeth, normal blood clotting, and nervous system functioning, higher risk of osteoporosis and bone disease with non-compliance. Importance of Multivitamins: Many functions. Supply you with extra nutrients that you may be missing from food. May lead to iron deficiency anemia, weakness, fatigue, and many other symptoms with non-compliance. Importance of B Vitamins:  Important for red blood cell formation, metabolism, energy, and helps to maintain a healthy nervous system. Protein Supplement  Find one you like now. Use immediately after surgery. Look for:  35-50g protein each day from your protein supplement once you reach the progression diet. 0-3 g fat per serving  0-3 g sugar per serving    Protein drinks should be split in separate dosages. Recommend: Lifelong  1 year + Calcium Supplement:     Start taking within a month after surgery. Look for: Calcium Citrate Plus D (1500 mg per day)  Recommend: Citracal     .            Avoid chocolate chewable calcium. Can use chewable bariatric or [employer] brand or similar chewable. The body cannot absorb more than 500-600 mg of calcium at a time. Take for Life Multi-vitamin Supplement:      Start immediately after surgery: any complete chewable, such as: [facility] Complete chewables. Avoid [facility] sours or gummies.   They lack iron and other important nutrients and also have added sugar. Continue with chewable vitamin or change to adult complete multivitamin one month after surgery. Menstruating women can take a prenatal vitamin. Make sure has at least 18 mg iron and 809-918 mcg folic acid   Vitamin X10, B Complex Vitamin, and Biotin  Start taking within a month after surgery. Vitamin B12:  1000 mcg of Vitamin B12 three times weekly    Must take sublingually (meaning you take it under your tongue) or in a liquid drop form for easy absorption. B Complex Vitamin: Take a pill or liquid drop form once daily. Biotin: This vitamin can help prevent hair loss. Recommend 5mg   (5000 mcg) a day  Biotin is Optional           Learning About Being Physically Active  What is physical activity? Being physically active means doing any kind of activity that gets your body moving. The types of physical activity that can help you get fit and stay healthy include:  Aerobic or \"cardio\" activities. These make your heart beat faster and make you breathe harder, such as brisk walking, riding a bike, or running. They strengthen your heart and lungs and build up your endurance. Strength training activities. These make your muscles work against, or \"resist,\" something. Examples include lifting weights or doing push-ups. These activities help tone and strengthen your muscles and bones. Stretches. These let you move your joints and muscles through their full range of motion. Stretching helps you be more flexible. What are the benefits of being active? Being active is one of the best things you can do for your health. It helps you to:  Feel stronger and have more energy to do all the things you like to do. Focus better at school or work. Feel, think, and sleep better. Reach and stay at a healthy weight. Lose fat and build lean muscle. Lower your risk for serious health problems, including diabetes, heart attack, high blood pressure, and some cancers.   Keep your heart, lungs, bones, muscles, and joints strong and healthy. How can you make being active part of your life? Start slowly. Make it your long-term goal to get at least 30 minutes of exercise on most days of the week. Walking is a good choice. You also may want to do other activities, such as running, swimming, cycling, or playing tennis or team sports. Pick activities that you like--ones that make your heart beat faster, your muscles stronger, and your muscles and joints more flexible. If you find more than one thing you like doing, do them all. You don't have to do the same thing every day. Get your heart pumping every day. Any activity that makes your heart beat faster and keeps it at that rate for a while counts. Here are some great ways to get your heart beating faster:  Go for a brisk walk, run, or bike ride. Go for a hike or swim. Go in-line skating. Play a game of touch football, basketball, or soccer. Ride a bike. Play tennis or racquetball. Climb stairs. Even some household chores can be aerobic--just do them at a faster pace. Vacuuming, raking or mowing the lawn, sweeping the garage, and washing and waxing the car all can help get your heart rate up. Strengthen your muscles during the week. You don't have to lift heavy weights or grow big, bulky muscles to get stronger. Doing a few simple activities that make your muscles work against, or \"resist,\" something can help you get stronger. For example, you can:  Do push-ups or sit-ups, which use your own body weight as resistance. Lift weights or dumbbells or use stretch bands at home or in a gym or community center. Stretch your muscles often. Stretching will help you as you become more active. It can help you stay flexible, loosen tight muscles, and avoid injury. It can also help improve your balance and posture and can be a great way to relax. Be sure to stretch the muscles you'll be using when you work out.  It's best to warm your muscles slightly before you stretch them. Walk or do some other light aerobic activity for a few minutes, and then start stretching. When you stretch your muscles:  Do it slowly. Stretching is not about going fast or making sudden movements. Don't push or bounce during a stretch. Hold each stretch for at least 15 to 30 seconds, if you can. You should feel a stretch in the muscle, but not pain. Breathe out as you do the stretch. Then breathe in as you hold the stretch. Don't hold your breath. If you're worried about how more activity might affect your health, have a checkup before you start. Follow any special advice your doctor gives you for getting a smart start. Where can you learn more? Go to http://www.gray.com/  Enter [id number] in the search box to learn more about \"Learning About Being Physically Active. \"  Current as of: May 12, 2021               Content Version: 13.2  © 6432-6929 [employer]. Care instructions adapted under license by [employer] (which disclaims liability or warranty for this information). If you have questions about a medical condition or this instruction, always ask your healthcare professional. [place] [address] any warranty or liability for your use of this information.

## 2022-07-26 DIAGNOSIS — K90.9 INTESTINAL MALABSORPTION, UNSPECIFIED TYPE: ICD-10-CM

## 2022-07-26 DIAGNOSIS — R79.0 LOW FERRITIN: Primary | ICD-10-CM

## 2022-07-26 DIAGNOSIS — Z98.84 S/P BARIATRIC SURGERY: ICD-10-CM

## 2022-07-26 LAB — VIT B1 BLD-SCNC: 137 NMOL/L (ref 66.5–200)

## 2022-08-03 DIAGNOSIS — R79.0 LOW FERRITIN LEVEL: ICD-10-CM

## 2022-08-03 DIAGNOSIS — Z98.84 S/P BARIATRIC SURGERY: ICD-10-CM

## 2022-08-03 DIAGNOSIS — K90.9 INTESTINAL MALABSORPTION, UNSPECIFIED TYPE: Primary | ICD-10-CM

## 2023-08-29 ENCOUNTER — OFFICE VISIT (OUTPATIENT)
Age: 68
End: 2023-08-29
Payer: MEDICARE

## 2023-08-29 ENCOUNTER — HOSPITAL ENCOUNTER (OUTPATIENT)
Facility: HOSPITAL | Age: 68
Discharge: HOME | End: 2023-09-01
Payer: MEDICARE

## 2023-08-29 VITALS
DIASTOLIC BLOOD PRESSURE: 65 MMHG | OXYGEN SATURATION: 100 % | BODY MASS INDEX: 33.99 KG/M2 | HEART RATE: 63 BPM | WEIGHT: 204 LBS | SYSTOLIC BLOOD PRESSURE: 128 MMHG | HEIGHT: 65 IN | TEMPERATURE: 97.1 F

## 2023-08-29 DIAGNOSIS — D50.9 IRON DEFICIENCY ANEMIA, UNSPECIFIED IRON DEFICIENCY ANEMIA TYPE: ICD-10-CM

## 2023-08-29 DIAGNOSIS — K90.9 INTESTINAL MALABSORPTION, UNSPECIFIED TYPE: Primary | ICD-10-CM

## 2023-08-29 DIAGNOSIS — Z98.84 S/P GASTRIC BYPASS: ICD-10-CM

## 2023-08-29 DIAGNOSIS — K21.9 GASTROESOPHAGEAL REFLUX DISEASE, UNSPECIFIED WHETHER ESOPHAGITIS PRESENT: ICD-10-CM

## 2023-08-29 DIAGNOSIS — K90.9 INTESTINAL MALABSORPTION, UNSPECIFIED TYPE: ICD-10-CM

## 2023-08-29 LAB
25(OH)D3 SERPL-MCNC: 48.5 NG/ML (ref 30–100)
ALBUMIN SERPL-MCNC: 3.9 G/DL (ref 3.4–5)
ALBUMIN/GLOB SERPL: 1.4 (ref 0.8–1.7)
ALP SERPL-CCNC: 87 U/L (ref 45–117)
ALT SERPL-CCNC: 54 U/L (ref 13–56)
ANION GAP SERPL CALC-SCNC: 4 MMOL/L (ref 3–18)
AST SERPL-CCNC: 36 U/L (ref 10–38)
BASOPHILS # BLD: 0 K/UL (ref 0–0.1)
BASOPHILS NFR BLD: 0 % (ref 0–2)
BILIRUB SERPL-MCNC: 0.4 MG/DL (ref 0.2–1)
BUN SERPL-MCNC: 10 MG/DL (ref 7–18)
BUN/CREAT SERPL: 15 (ref 12–20)
CALCIUM SERPL-MCNC: 9.3 MG/DL (ref 8.5–10.1)
CHLORIDE SERPL-SCNC: 110 MMOL/L (ref 100–111)
CO2 SERPL-SCNC: 29 MMOL/L (ref 21–32)
CREAT SERPL-MCNC: 0.66 MG/DL (ref 0.6–1.3)
DIFFERENTIAL METHOD BLD: NORMAL
EOSINOPHIL # BLD: 0.2 K/UL (ref 0–0.4)
EOSINOPHIL NFR BLD: 5 % (ref 0–5)
ERYTHROCYTE [DISTWIDTH] IN BLOOD BY AUTOMATED COUNT: 13.9 % (ref 11.6–14.5)
FERRITIN SERPL-MCNC: 63 NG/ML (ref 8–388)
FOLATE SERPL-MCNC: >20 NG/ML (ref 3.1–17.5)
GLOBULIN SER CALC-MCNC: 2.8 G/DL (ref 2–4)
GLUCOSE SERPL-MCNC: 92 MG/DL (ref 74–99)
HCT VFR BLD AUTO: 39.5 % (ref 35–45)
HGB BLD-MCNC: 12.8 G/DL (ref 12–16)
IMM GRANULOCYTES # BLD AUTO: 0 K/UL (ref 0–0.04)
IMM GRANULOCYTES NFR BLD AUTO: 0 % (ref 0–0.5)
IRON SERPL-MCNC: 96 UG/DL (ref 50–175)
LYMPHOCYTES # BLD: 1.5 K/UL (ref 0.9–3.6)
LYMPHOCYTES NFR BLD: 31 % (ref 21–52)
MCH RBC QN AUTO: 29.1 PG (ref 24–34)
MCHC RBC AUTO-ENTMCNC: 32.4 G/DL (ref 31–37)
MCV RBC AUTO: 89.8 FL (ref 78–100)
MONOCYTES # BLD: 0.5 K/UL (ref 0.05–1.2)
MONOCYTES NFR BLD: 10 % (ref 3–10)
NEUTS SEG # BLD: 2.5 K/UL (ref 1.8–8)
NEUTS SEG NFR BLD: 54 % (ref 40–73)
NRBC # BLD: 0 K/UL (ref 0–0.01)
NRBC BLD-RTO: 0 PER 100 WBC
PLATELET # BLD AUTO: 383 K/UL (ref 135–420)
PMV BLD AUTO: 9.8 FL (ref 9.2–11.8)
POTASSIUM SERPL-SCNC: 4.4 MMOL/L (ref 3.5–5.5)
PROT SERPL-MCNC: 6.7 G/DL (ref 6.4–8.2)
RBC # BLD AUTO: 4.4 M/UL (ref 4.2–5.3)
SODIUM SERPL-SCNC: 143 MMOL/L (ref 136–145)
VIT B12 SERPL-MCNC: 852 PG/ML (ref 211–911)
WBC # BLD AUTO: 4.6 K/UL (ref 4.6–13.2)

## 2023-08-29 PROCEDURE — 36415 COLL VENOUS BLD VENIPUNCTURE: CPT

## 2023-08-29 PROCEDURE — 82607 VITAMIN B-12: CPT

## 2023-08-29 PROCEDURE — 3017F COLORECTAL CA SCREEN DOC REV: CPT | Performed by: NURSE PRACTITIONER

## 2023-08-29 PROCEDURE — 1123F ACP DISCUSS/DSCN MKR DOCD: CPT | Performed by: NURSE PRACTITIONER

## 2023-08-29 PROCEDURE — 80053 COMPREHEN METABOLIC PANEL: CPT

## 2023-08-29 PROCEDURE — 84425 ASSAY OF VITAMIN B-1: CPT

## 2023-08-29 PROCEDURE — G8417 CALC BMI ABV UP PARAM F/U: HCPCS | Performed by: NURSE PRACTITIONER

## 2023-08-29 PROCEDURE — 1036F TOBACCO NON-USER: CPT | Performed by: NURSE PRACTITIONER

## 2023-08-29 PROCEDURE — 83540 ASSAY OF IRON: CPT

## 2023-08-29 PROCEDURE — 82746 ASSAY OF FOLIC ACID SERUM: CPT

## 2023-08-29 PROCEDURE — G8427 DOCREV CUR MEDS BY ELIG CLIN: HCPCS | Performed by: NURSE PRACTITIONER

## 2023-08-29 PROCEDURE — 99214 OFFICE O/P EST MOD 30 MIN: CPT | Performed by: NURSE PRACTITIONER

## 2023-08-29 PROCEDURE — 82728 ASSAY OF FERRITIN: CPT

## 2023-08-29 PROCEDURE — 1090F PRES/ABSN URINE INCON ASSESS: CPT | Performed by: NURSE PRACTITIONER

## 2023-08-29 PROCEDURE — 3074F SYST BP LT 130 MM HG: CPT | Performed by: NURSE PRACTITIONER

## 2023-08-29 PROCEDURE — 85025 COMPLETE CBC W/AUTO DIFF WBC: CPT

## 2023-08-29 PROCEDURE — 82306 VITAMIN D 25 HYDROXY: CPT

## 2023-08-29 PROCEDURE — G8400 PT W/DXA NO RESULTS DOC: HCPCS | Performed by: NURSE PRACTITIONER

## 2023-08-29 PROCEDURE — 3078F DIAST BP <80 MM HG: CPT | Performed by: NURSE PRACTITIONER

## 2023-08-29 RX ORDER — M-VIT,TX,IRON,MINS/CALC/FOLIC 27MG-0.4MG
1 TABLET ORAL DAILY
COMMUNITY

## 2023-08-29 RX ORDER — DOXEPIN HYDROCHLORIDE 25 MG/1
25 CAPSULE ORAL
COMMUNITY
Start: 2023-05-02

## 2023-08-29 RX ORDER — CELECOXIB 200 MG/1
CAPSULE ORAL
COMMUNITY
Start: 2022-06-22

## 2023-08-29 RX ORDER — PRAVASTATIN SODIUM 40 MG
TABLET ORAL
COMMUNITY
Start: 2023-06-10

## 2023-08-29 RX ORDER — LANOLIN ALCOHOL/MO/W.PET/CERES
CREAM (GRAM) TOPICAL
COMMUNITY
Start: 2023-08-18

## 2023-08-29 RX ORDER — AMLODIPINE BESYLATE 5 MG/1
TABLET ORAL
COMMUNITY
Start: 2023-06-11

## 2023-08-29 RX ORDER — FERROUS SULFATE 325(65) MG
325 TABLET ORAL
COMMUNITY

## 2023-08-29 RX ORDER — GABAPENTIN 600 MG/1
TABLET ORAL
COMMUNITY
Start: 2023-07-30

## 2023-08-29 ASSESSMENT — PATIENT HEALTH QUESTIONNAIRE - PHQ9
SUM OF ALL RESPONSES TO PHQ QUESTIONS 1-9: 0
SUM OF ALL RESPONSES TO PHQ QUESTIONS 1-9: 0
2. FEELING DOWN, DEPRESSED OR HOPELESS: 0
1. LITTLE INTEREST OR PLEASURE IN DOING THINGS: 0
SUM OF ALL RESPONSES TO PHQ9 QUESTIONS 1 & 2: 0
SUM OF ALL RESPONSES TO PHQ QUESTIONS 1-9: 0
SUM OF ALL RESPONSES TO PHQ QUESTIONS 1-9: 0

## 2023-08-29 NOTE — PROGRESS NOTES
given on the effects of sleep deprivation on weight control. Discussed patients weight loss goals and dietary choices in relation to goals. Reminded to measure portions, continue high protein, low carbohydrate diet. Reminded to eat regularly, to eat slowly & not to drink with meals. Continue cardio exercise and add resistance exercises. 60-90 minutes of aerobic activity 5 days a week and strength training 2 days each week. Encouraged to attend support group   Required fluid intake is >64oz daily of decaffeinated sugar free beverages. 3. Iron deficiency anemia - hx iron infusion, on po supplementation, check ferritin, iron and CBC  4. GERD - stable, continue PPI    Labs ordered today  Follow up in 1 year or sooner if patient has questions, concerns or worsening of condition, if unable to reach our office, patient should report to the ED. Ms. Ivana Lubin has a reminder for a \"due or due soon\" health maintenance. I have asked that she contact her primary care provider for a follow-up on this health maintenance.      Total time spent with patient was 30 minutes

## 2023-08-29 NOTE — PATIENT INSTRUCTIONS
adult complete multivitamin one month after surgery. Menstruating women can take a prenatal vitamin. Make sure has at least 18 mg iron and 827-347 mcg folic acid   Vitamin O55, B Complex Vitamin, and Biotin  Start taking within a month after surgery. Vitamin B12:  1000 mcg of Vitamin B12 three times weekly    Must take sublingually (meaning you take it under your tongue) or in a liquid drop form for easy absorption. B Complex Vitamin: Take a pill or liquid drop form once daily. Biotin: This vitamin can help prevent hair loss.     Recommend 5mg   (5000 mcg) a day  Biotin is Optional

## 2023-08-31 LAB — VIT B1 BLD-SCNC: 148 NMOL/L (ref 66.5–200)

## 2023-09-13 ENCOUNTER — HOSPITAL ENCOUNTER (OUTPATIENT)
Facility: HOSPITAL | Age: 68
Discharge: HOME | End: 2023-09-16

## 2023-09-13 VITALS — WEIGHT: 202 LBS | HEIGHT: 65 IN | BODY MASS INDEX: 33.66 KG/M2

## 2023-09-13 NOTE — PROGRESS NOTES
about the required vitamin/mineral supplements and the importance of taking them as prescribed. Reinforced the dosages of vitamins/minerals to patient. Patient was reminded that the prescribed vitamins/minerals are lifelong requirements to prevent micronutrient deficiencies. All patient's questions were answered, and they were provided with my contact information for future nutrition-related questions or concerns.       GIACOMO JOSE RD

## 2023-10-10 ENCOUNTER — OFFICE VISIT (OUTPATIENT)
Age: 68
End: 2023-10-10
Payer: MEDICARE

## 2023-10-10 ENCOUNTER — HOSPITAL ENCOUNTER (OUTPATIENT)
Facility: HOSPITAL | Age: 68
Discharge: HOME | End: 2023-10-12
Payer: MEDICARE

## 2023-10-10 VITALS
WEIGHT: 204 LBS | TEMPERATURE: 97.6 F | HEART RATE: 63 BPM | HEIGHT: 65 IN | OXYGEN SATURATION: 99 % | SYSTOLIC BLOOD PRESSURE: 134 MMHG | DIASTOLIC BLOOD PRESSURE: 70 MMHG | BODY MASS INDEX: 33.99 KG/M2 | RESPIRATION RATE: 17 BRPM

## 2023-10-10 DIAGNOSIS — Z71.3 WEIGHT LOSS COUNSELING, ENCOUNTER FOR: ICD-10-CM

## 2023-10-10 DIAGNOSIS — Z71.3 ENCOUNTER FOR DIETARY CONSULTATION: ICD-10-CM

## 2023-10-10 DIAGNOSIS — E66.09 CLASS 1 OBESITY DUE TO EXCESS CALORIES WITH SERIOUS COMORBIDITY AND BODY MASS INDEX (BMI) OF 33.0 TO 33.9 IN ADULT: Primary | ICD-10-CM

## 2023-10-10 LAB
EKG ATRIAL RATE: 59 BPM
EKG DIAGNOSIS: NORMAL
EKG P AXIS: 43 DEGREES
EKG P-R INTERVAL: 144 MS
EKG Q-T INTERVAL: 482 MS
EKG QRS DURATION: 82 MS
EKG QTC CALCULATION (BAZETT): 477 MS
EKG R AXIS: 22 DEGREES
EKG T AXIS: 43 DEGREES
EKG VENTRICULAR RATE: 59 BPM

## 2023-10-10 PROCEDURE — 1036F TOBACCO NON-USER: CPT | Performed by: NURSE PRACTITIONER

## 2023-10-10 PROCEDURE — 3075F SYST BP GE 130 - 139MM HG: CPT | Performed by: NURSE PRACTITIONER

## 2023-10-10 PROCEDURE — 1090F PRES/ABSN URINE INCON ASSESS: CPT | Performed by: NURSE PRACTITIONER

## 2023-10-10 PROCEDURE — 99203 OFFICE O/P NEW LOW 30 MIN: CPT | Performed by: NURSE PRACTITIONER

## 2023-10-10 PROCEDURE — 3078F DIAST BP <80 MM HG: CPT | Performed by: NURSE PRACTITIONER

## 2023-10-10 PROCEDURE — 1123F ACP DISCUSS/DSCN MKR DOCD: CPT | Performed by: NURSE PRACTITIONER

## 2023-10-10 PROCEDURE — G8400 PT W/DXA NO RESULTS DOC: HCPCS | Performed by: NURSE PRACTITIONER

## 2023-10-10 PROCEDURE — G8417 CALC BMI ABV UP PARAM F/U: HCPCS | Performed by: NURSE PRACTITIONER

## 2023-10-10 PROCEDURE — 93010 ELECTROCARDIOGRAM REPORT: CPT | Performed by: INTERNAL MEDICINE

## 2023-10-10 PROCEDURE — 3017F COLORECTAL CA SCREEN DOC REV: CPT | Performed by: NURSE PRACTITIONER

## 2023-10-10 PROCEDURE — 93005 ELECTROCARDIOGRAM TRACING: CPT | Performed by: NURSE PRACTITIONER

## 2023-10-10 PROCEDURE — G8427 DOCREV CUR MEDS BY ELIG CLIN: HCPCS | Performed by: NURSE PRACTITIONER

## 2023-10-10 PROCEDURE — G8484 FLU IMMUNIZE NO ADMIN: HCPCS | Performed by: NURSE PRACTITIONER

## 2023-10-10 ASSESSMENT — ENCOUNTER SYMPTOMS
GASTROINTESTINAL NEGATIVE: 1
RESPIRATORY NEGATIVE: 1
ALLERGIC/IMMUNOLOGIC NEGATIVE: 1
EYES NEGATIVE: 1

## 2023-10-11 DIAGNOSIS — E66.09 CLASS 1 OBESITY DUE TO EXCESS CALORIES WITH SERIOUS COMORBIDITY AND BODY MASS INDEX (BMI) OF 33.0 TO 33.9 IN ADULT: Primary | ICD-10-CM

## 2023-10-11 RX ORDER — PHENTERMINE HYDROCHLORIDE 37.5 MG/1
37.5 TABLET ORAL
Qty: 30 TABLET | Refills: 2 | Status: SHIPPED | OUTPATIENT
Start: 2023-10-11 | End: 2024-01-09

## 2023-10-12 ENCOUNTER — TELEPHONE (OUTPATIENT)
Age: 68
End: 2023-10-12

## 2023-10-12 NOTE — TELEPHONE ENCOUNTER
Patient called and stated that Radha is having a hard time filling her medication and that it may need a prior auth.  Patient can be reached at [phone]

## 2023-11-14 ENCOUNTER — TELEMEDICINE (OUTPATIENT)
Age: 68
End: 2023-11-14
Payer: MEDICARE

## 2023-11-14 DIAGNOSIS — Z71.3 WEIGHT LOSS COUNSELING, ENCOUNTER FOR: ICD-10-CM

## 2023-11-14 DIAGNOSIS — E66.09 CLASS 1 OBESITY DUE TO EXCESS CALORIES WITH SERIOUS COMORBIDITY AND BODY MASS INDEX (BMI) OF 33.0 TO 33.9 IN ADULT: Primary | ICD-10-CM

## 2023-11-14 DIAGNOSIS — Z71.3 ENCOUNTER FOR DIETARY CONSULTATION: ICD-10-CM

## 2023-11-14 PROCEDURE — 1090F PRES/ABSN URINE INCON ASSESS: CPT | Performed by: NURSE PRACTITIONER

## 2023-11-14 PROCEDURE — 1123F ACP DISCUSS/DSCN MKR DOCD: CPT | Performed by: NURSE PRACTITIONER

## 2023-11-14 PROCEDURE — 1036F TOBACCO NON-USER: CPT | Performed by: NURSE PRACTITIONER

## 2023-11-14 PROCEDURE — G8484 FLU IMMUNIZE NO ADMIN: HCPCS | Performed by: NURSE PRACTITIONER

## 2023-11-14 PROCEDURE — G8417 CALC BMI ABV UP PARAM F/U: HCPCS | Performed by: NURSE PRACTITIONER

## 2023-11-14 PROCEDURE — 3017F COLORECTAL CA SCREEN DOC REV: CPT | Performed by: NURSE PRACTITIONER

## 2023-11-14 PROCEDURE — 99213 OFFICE O/P EST LOW 20 MIN: CPT | Performed by: NURSE PRACTITIONER

## 2023-11-14 PROCEDURE — G8427 DOCREV CUR MEDS BY ELIG CLIN: HCPCS | Performed by: NURSE PRACTITIONER

## 2023-11-14 PROCEDURE — G8400 PT W/DXA NO RESULTS DOC: HCPCS | Performed by: NURSE PRACTITIONER

## 2023-11-14 ASSESSMENT — ENCOUNTER SYMPTOMS
RESPIRATORY NEGATIVE: 1
EYES NEGATIVE: 1

## 2023-11-14 NOTE — PROGRESS NOTES
Act, [address] and the [employer] Act. Patient identification was verified, and a caregiver was present when appropriate. The patient was located in a state where the provider was licensed to provide care.      Provider location while conducting visit: Office  Patient location: Home  Other person participating in the telehealth services: Rodrigo Trejo MA  This virtual visit was conducted via interactive/real-time audio/video using [place] ([employer])  Visit start: 1126a  Visit end: [facility]

## 2023-12-15 ENCOUNTER — OFFICE VISIT (OUTPATIENT)
Age: 68
End: 2023-12-15
Payer: MEDICARE

## 2023-12-15 VITALS
HEIGHT: 65 IN | DIASTOLIC BLOOD PRESSURE: 84 MMHG | WEIGHT: 203 LBS | OXYGEN SATURATION: 99 % | TEMPERATURE: 97.6 F | RESPIRATION RATE: 17 BRPM | SYSTOLIC BLOOD PRESSURE: 139 MMHG | BODY MASS INDEX: 33.82 KG/M2 | HEART RATE: 74 BPM

## 2023-12-15 DIAGNOSIS — E66.09 CLASS 1 OBESITY DUE TO EXCESS CALORIES WITH SERIOUS COMORBIDITY AND BODY MASS INDEX (BMI) OF 33.0 TO 33.9 IN ADULT: Primary | ICD-10-CM

## 2023-12-15 DIAGNOSIS — Z71.3 ENCOUNTER FOR DIETARY CONSULTATION: ICD-10-CM

## 2023-12-15 DIAGNOSIS — Z71.3 WEIGHT LOSS COUNSELING, ENCOUNTER FOR: ICD-10-CM

## 2023-12-15 PROCEDURE — 3075F SYST BP GE 130 - 139MM HG: CPT | Performed by: NURSE PRACTITIONER

## 2023-12-15 PROCEDURE — G8417 CALC BMI ABV UP PARAM F/U: HCPCS | Performed by: NURSE PRACTITIONER

## 2023-12-15 PROCEDURE — 3079F DIAST BP 80-89 MM HG: CPT | Performed by: NURSE PRACTITIONER

## 2023-12-15 PROCEDURE — 1123F ACP DISCUSS/DSCN MKR DOCD: CPT | Performed by: NURSE PRACTITIONER

## 2023-12-15 PROCEDURE — G8427 DOCREV CUR MEDS BY ELIG CLIN: HCPCS | Performed by: NURSE PRACTITIONER

## 2023-12-15 PROCEDURE — G8400 PT W/DXA NO RESULTS DOC: HCPCS | Performed by: NURSE PRACTITIONER

## 2023-12-15 PROCEDURE — G8484 FLU IMMUNIZE NO ADMIN: HCPCS | Performed by: NURSE PRACTITIONER

## 2023-12-15 PROCEDURE — 1090F PRES/ABSN URINE INCON ASSESS: CPT | Performed by: NURSE PRACTITIONER

## 2023-12-15 PROCEDURE — 1036F TOBACCO NON-USER: CPT | Performed by: NURSE PRACTITIONER

## 2023-12-15 PROCEDURE — 99213 OFFICE O/P EST LOW 20 MIN: CPT | Performed by: NURSE PRACTITIONER

## 2023-12-15 PROCEDURE — 3017F COLORECTAL CA SCREEN DOC REV: CPT | Performed by: NURSE PRACTITIONER

## 2023-12-15 NOTE — PROGRESS NOTES
[facility] Progress Note:   F/up Provider Visit    Chief Complaint   Patient presents with    Follow-up     Medical Weight Loss - Phentermine        Melissa Gutierrez is a 80 y.o. female who is here for her  f/up medical weight loss visit for the medication program. Patient did fair this past month she is down 1lb and 4.5in overall this visit. -1 lbs lost.   Arm Circumference: 12in  Waist Circumference: 38in  Thigh Circumference: 20in  Percent Body Fat: 43.2%      Progress and challenges thus far. Patient states that she tried going to half a phentermine tablet like I suggested and she still developed heart palpitations so she ceased taking the medication. She stated [facility] so I advised patient to go to her PCP and have them initiate that for her as it will likely have a better chance of being approved than coming from a weight loss clinic. Advised in the interim to continue to watch her carb/starch/sugar consumption and to continue to try and become consistent with her exercise regimen. Diet? Low carb/sugar/starch, high protein/vegetable. Did you have any problems adhering to the program last week? No.  If yes, please explain:     Since your last visit, have you experienced any complications? No.    Have you received any other medical care this week? No.  If yes, where and for what? Have you had any change in your medications since your last visit? No.  If yes what? Are you taking an appetite suppressant? Patient d/c use of Phentermine after trying half tablets - still had side effects. If yes:  Do you need a refill? BP Readings from Last 3 Encounters:   12/15/23 139/84   10/10/23 134/70   08/29/23 128/65        Eating Habits Over Last Week:  Did you take in 64 oz of non-caloric fluids? Yes. Did you consume your prescribed meal replacement regimen each day? Yes.       Physical Activity Over the Past Week:    Aerobic exercise: Walking 3

## 2024-09-20 ENCOUNTER — CLINICAL DOCUMENTATION (OUTPATIENT)
Age: 69
End: 2024-09-20

## (undated) DEVICE — STRAP,POSITIONING,KNEE/BODY,FOAM,4X60": Brand: MEDLINE

## (undated) DEVICE — STERILE POLYISOPRENE POWDER-FREE SURGICAL GLOVES: Brand: PROTEXIS

## (undated) DEVICE — GOWN ISOLATN REG BLU POLY UNISX W/ THMB LOOP

## (undated) DEVICE — GARMENT,MEDLINE,DVT,INT,CALF,MED, GEN2: Brand: MEDLINE

## (undated) DEVICE — MAJ-1414 SINGLE USE ADPATER BIOPSY VALV: Brand: SINGLE USE ADAPTOR BIOPSY VALVE